# Patient Record
Sex: MALE | Race: BLACK OR AFRICAN AMERICAN | NOT HISPANIC OR LATINO | Employment: FULL TIME | URBAN - NONMETROPOLITAN AREA
[De-identification: names, ages, dates, MRNs, and addresses within clinical notes are randomized per-mention and may not be internally consistent; named-entity substitution may affect disease eponyms.]

---

## 2017-04-24 ENCOUNTER — OFFICE VISIT (OUTPATIENT)
Dept: URGENT CARE | Facility: PHYSICIAN GROUP | Age: 37
End: 2017-04-24

## 2017-04-24 VITALS
RESPIRATION RATE: 14 BRPM | WEIGHT: 206 LBS | DIASTOLIC BLOOD PRESSURE: 62 MMHG | HEART RATE: 84 BPM | SYSTOLIC BLOOD PRESSURE: 100 MMHG | OXYGEN SATURATION: 98 % | TEMPERATURE: 98.6 F | BODY MASS INDEX: 29.49 KG/M2 | HEIGHT: 70 IN

## 2017-04-24 DIAGNOSIS — R10.2 PERINEAL PAIN IN MALE: ICD-10-CM

## 2017-04-24 PROCEDURE — 99214 OFFICE O/P EST MOD 30 MIN: CPT | Performed by: PHYSICIAN ASSISTANT

## 2017-04-24 RX ORDER — CIPROFLOXACIN 500 MG/1
500 TABLET, FILM COATED ORAL 2 TIMES DAILY
Qty: 28 TAB | Refills: 0 | Status: SHIPPED | OUTPATIENT
Start: 2017-04-24 | End: 2017-05-08

## 2017-04-24 RX ORDER — CEFTRIAXONE SODIUM 250 MG/1
250 INJECTION, POWDER, FOR SOLUTION INTRAMUSCULAR; INTRAVENOUS ONCE
Status: COMPLETED | OUTPATIENT
Start: 2017-04-24 | End: 2017-04-24

## 2017-04-24 RX ADMIN — CEFTRIAXONE SODIUM 250 MG: 250 INJECTION, POWDER, FOR SOLUTION INTRAMUSCULAR; INTRAVENOUS at 17:09

## 2017-04-25 NOTE — PROGRESS NOTES
Chief Complaint   Patient presents with   • Dysuria     Prostate issues       HISTORY OF PRESENT ILLNESS: Patient is a 36 y.o. male who presents today for prostate issues. Patient states this same issue has had in the past. He used to see urology and was on antibiotics for months at a time without resolution of his symptoms. He lost his insurance and has not been back to urology. He was seen in September 2016 for the same thing, given a shot of Rocephin, and the symptoms went away until 2 days ago. He is asking if he can have a shot today. Patient is sexually active with one female partner, in a monogamous relationship, same partner in September. He denies any concern for STDs and denies any penile discharge. He denies any urinary symptoms including urinary hesitancy, dysuria, weak stream, and hematuria. He complains of discomfort in the perineal area but denies testicular pain, swelling, genital rashes, rectal pain, and overt perineal pain. His father was diagnosed with prostate cancer he thinks around age 45. Patient denies any fevers, night sweats, chills, bodyaches, and unexplained weight loss.    There are no active problems to display for this patient.      Allergies:Review of patient's allergies indicates no known allergies.    Current Outpatient Prescriptions Ordered in [facility]   Medication Sig Dispense Refill   • ciprofloxacin (CIPRO) 500 MG Tab Take 1 Tab by mouth 2 times a day for 14 days. 28 Tab 0   • doxycycline (VIBRAMYCIN) 100 MG Tab Take 1 Tab by mouth 2 times a day. 20 Tab 0   • hydrocodone-acetaminophen (NORCO) 7.5-325 MG per tablet Take 1 Tab by mouth every 6 hours as needed (pain). 20 Tab 0     Current Facility-Administered Medications Ordered in Epic   Medication Dose Route Frequency Provider Last Rate Last Dose   • cefTRIAXone (ROCEPHIN) injection 250 mg  250 mg Intramuscular Once Jennyfer Laureano PA-C           History reviewed. No pertinent past medical history.    Social History   Substance  "Use Topics   • Smoking status: Never Smoker    • Smokeless tobacco: None   • Alcohol Use: No       No family status information on file.   History reviewed. No pertinent family history.    ROS:   Review of Systems   Constitutional: Negative for fever, chills, weight loss and malaise/fatigue.   HENT: Negative for ear pain, nosebleeds, congestion, sore throat and neck pain.    Eyes: Negative for blurred vision.   Respiratory: Negative for cough, sputum production, shortness of breath and wheezing.    Cardiovascular: Negative for chest pain, palpitations, orthopnea and leg swelling.   Gastrointestinal: Negative for heartburn, nausea, vomiting and abdominal pain.   Genitourinary: Negative for dysuria, urgency and frequency.       Exam:  Blood pressure 100/62, pulse 84, temperature 37 °C (98.6 °F), resp. rate 14, height 1.778 m (5' 10\"), weight 93.441 kg (206 lb), SpO2 98 %.  General: Normal appearing. No distress.  HEENT: Head is grossly normal.  Pulmonary: Clear to ausculation and percussion.  Normal effort. No rales, ronchi, or wheezing.   Cardiovascular: Regular rate and rhythm without murmur.    /rectal: Deferred by patient.  Skin: No obvious lesions.  Psych: Normal mood. Alert and oriented x3. Judgment and insight is normal.     Rocephin 250 mg IM    Assessment/Plan:  Patient to take Cipro if his symptoms persist after the Rocephin injection. Follow-up with urology for persistent or worsening symptoms. Patient to have his PSA check. Will contact patient with lab results.  1. Perineal pain in male  ciprofloxacin (CIPRO) 500 MG Tab    cefTRIAXone (ROCEPHIN) injection 250 mg    PROSTATE SPECIFIC AG         "

## 2017-10-19 ENCOUNTER — OFFICE VISIT (OUTPATIENT)
Dept: URGENT CARE | Facility: PHYSICIAN GROUP | Age: 37
End: 2017-10-19
Payer: COMMERCIAL

## 2017-10-19 VITALS
WEIGHT: 200.2 LBS | RESPIRATION RATE: 16 BRPM | HEIGHT: 70 IN | SYSTOLIC BLOOD PRESSURE: 110 MMHG | BODY MASS INDEX: 28.66 KG/M2 | TEMPERATURE: 98.3 F | HEART RATE: 70 BPM | DIASTOLIC BLOOD PRESSURE: 72 MMHG | OXYGEN SATURATION: 94 %

## 2017-10-19 DIAGNOSIS — J22 ACUTE RESPIRATORY INFECTION: ICD-10-CM

## 2017-10-19 PROCEDURE — 99214 OFFICE O/P EST MOD 30 MIN: CPT | Performed by: FAMILY MEDICINE

## 2017-10-19 RX ORDER — AZITHROMYCIN 250 MG/1
TABLET, FILM COATED ORAL
Qty: 6 TAB | Refills: 0 | Status: SHIPPED | OUTPATIENT
Start: 2017-10-19 | End: 2018-02-06

## 2017-10-19 RX ORDER — PREDNISONE 20 MG/1
TABLET ORAL
Qty: 5 TAB | Refills: 0 | Status: SHIPPED | OUTPATIENT
Start: 2017-10-19 | End: 2018-02-06

## 2017-10-19 NOTE — PROGRESS NOTES
Chief Complaint:    Chief Complaint   Patient presents with   • Cough     fatigue, congestion,no mucus- pt states he had pneumonia 6 years ago       History of Present Illness:    This is a new problem. Symptoms x 5 weeks; has clear rhinorrhea, itchy throat, and non-productive cough. No fever. Using OTC Mucinex DM. Not getting better. Daughter also being seen with similar symptoms x 1 week. Exposed to family member with bronchial pneumonia.      Review of Systems:    Constitutional: Negative for fever, chills, and diaphoresis.   Eyes: Negative for change in vision, photophobia, pain, redness, and discharge.  ENT: See HPI.    Respiratory: See HPI.   Cardiovascular: Negative for chest pain, palpitations, orthopnea, claudication, leg swelling, and PND.   Gastrointestinal: Negative for abdominal pain, nausea, vomiting, diarrhea, constipation, blood in stool, and melena.   Genitourinary: Negative for dysuria, urinary urgency, urinary frequency, hematuria, and flank pain.   Musculoskeletal: Negative for myalgias, joint pain, neck pain, and back pain.   Skin: Negative for rash and itching.   Neurological: Negative for dizziness, tingling, tremors, sensory change, speech change, focal weakness, seizures, loss of consciousness, and headaches.   Endo: Negative for polydipsia.   Heme: Does not bruise/bleed easily.   Psychiatric/Behavioral: Negative for depression, suicidal ideas, hallucinations, memory loss and substance abuse. The patient is not nervous/anxious and does not have insomnia.      Past Medical History:    No past medical history on file.    Past Surgical History:    Past Surgical History:   Procedure Laterality Date   • VASECTOMY  2007       Social History:    Social History     Social History   • Marital status: Single     Spouse name: N/A   • Number of children: N/A   • Years of education: N/A     Occupational History   • Not on file.     Social History Main Topics   • Smoking status: Never Smoker   • Smokeless  "tobacco: Never Used   • Alcohol use 0.6 oz/week     1 Shots of liquor per week   • Drug use: No   • Sexual activity: Yes     Partners: Female     Birth control/ protection: Condom     Other Topics Concern   • Not on file     Social History Narrative   • No narrative on file       Family History:    History reviewed. No pertinent family history.    Medications:    No current outpatient prescriptions on file prior to visit.     No current facility-administered medications on file prior to visit.        Allergies:    No Known Allergies      Vitals:    Vitals:    10/19/17 1021   BP: 110/72   Pulse: 70   Resp: 16   Temp: 36.8 °C (98.3 °F)   SpO2: 94%   Weight: 90.8 kg (200 lb 3.2 oz)   Height: 1.778 m (5' 10\")       Physical Exam:    Constitutional: Vital signs reviewed. Appears well-developed and well-nourished. No acute distress.   Eyes: Sclera white, conjunctivae clear.   ENT: External ears normal. External auditory canals normal without discharge. TMs translucent and non-bulging. Hearing normal. Nasal mucosa erythematous bilaterally. Lips/teeth are normal. Oral mucosa pink and moist. Posterior pharynx: WNL.  Neck: Neck supple.   Cardiovascular: Regular rate and rhythm. No murmur.  Pulmonary/Chest: Respirations non-labored. Clear to auscultation bilaterally.  Lymph: Cervical nodes without tenderness or enlargement.  Musculoskeletal: Normal gait. Normal range of motion. No muscular atrophy or weakness.  Neurological: Alert and oriented to person, place, and time. Muscle tone normal. Coordination normal.   Skin: No rashes or lesions. Warm, dry, normal turgor.  Psychiatric: Normal mood and affect. Behavior is normal. Judgment and thought content normal.       Assessment / Plan:    1. Acute respiratory infection  - azithromycin (ZITHROMAX) 250 MG Tab; 2 TABS ON DAY 1, 1 TAB ON DAYS 2-5.  Dispense: 6 Tab; Refill: 0  - predniSONE (DELTASONE) 20 MG Tab; 1 TAB ONCE A DAY X 5 DAYS. TAKE WITH FOOD.  Dispense: 5 Tab; Refill: " 0      Work note given - excuse for 10/19 and 10/20/17. May return on 10/20/17 if better.    Discussed with him DDX (infection vs. allergies vs. both) and management options.    May continue with OTC meds for symptoms prn.    He is agreeable to treat for infection and allergies.    Agreeable to medications prescribed.    Follow-up with PCP or urgent care if getting worse or not better with above.

## 2017-10-19 NOTE — LETTER
October 19, 2017         Patient: Freddie Cuellar   YOB: 1980   Date of Visit: 10/19/2017           To Whom it May Concern:    Freddie Cuellar was seen in my clinic on 10/19/2017.     Please excuse from work for 10/19 and 10/20/17 due to medical condition.    May return on 10/20/17 if better.    If you have any questions or concerns, please don't hesitate to call.        Sincerely,           Dg Hensley M.D.  Electronically Signed

## 2018-02-06 ENCOUNTER — OFFICE VISIT (OUTPATIENT)
Dept: URGENT CARE | Facility: CLINIC | Age: 38
End: 2018-02-06
Payer: COMMERCIAL

## 2018-02-06 ENCOUNTER — APPOINTMENT (OUTPATIENT)
Dept: RADIOLOGY | Facility: IMAGING CENTER | Age: 38
End: 2018-02-06
Attending: PHYSICIAN ASSISTANT
Payer: COMMERCIAL

## 2018-02-06 VITALS
HEIGHT: 70 IN | SYSTOLIC BLOOD PRESSURE: 106 MMHG | OXYGEN SATURATION: 99 % | WEIGHT: 198.8 LBS | RESPIRATION RATE: 12 BRPM | DIASTOLIC BLOOD PRESSURE: 74 MMHG | BODY MASS INDEX: 28.46 KG/M2 | TEMPERATURE: 98.5 F | HEART RATE: 60 BPM

## 2018-02-06 DIAGNOSIS — S49.92XA INJURY OF LEFT SHOULDER, INITIAL ENCOUNTER: ICD-10-CM

## 2018-02-06 DIAGNOSIS — Z23 NEED FOR INFLUENZA VACCINATION: ICD-10-CM

## 2018-02-06 PROCEDURE — 73030 X-RAY EXAM OF SHOULDER: CPT | Mod: TC,LT | Performed by: PHYSICIAN ASSISTANT

## 2018-02-06 PROCEDURE — 90471 IMMUNIZATION ADMIN: CPT | Performed by: PHYSICIAN ASSISTANT

## 2018-02-06 PROCEDURE — 90686 IIV4 VACC NO PRSV 0.5 ML IM: CPT | Performed by: PHYSICIAN ASSISTANT

## 2018-02-06 PROCEDURE — 99214 OFFICE O/P EST MOD 30 MIN: CPT | Mod: 25 | Performed by: PHYSICIAN ASSISTANT

## 2018-02-06 RX ORDER — METHYLPREDNISOLONE SODIUM SUCCINATE 125 MG/2ML
125 INJECTION, POWDER, LYOPHILIZED, FOR SOLUTION INTRAMUSCULAR; INTRAVENOUS ONCE
Status: COMPLETED | OUTPATIENT
Start: 2018-02-06 | End: 2018-02-06

## 2018-02-06 RX ADMIN — METHYLPREDNISOLONE SODIUM SUCCINATE 125 MG: 125 INJECTION, POWDER, LYOPHILIZED, FOR SOLUTION INTRAMUSCULAR; INTRAVENOUS at 10:12

## 2018-02-06 ASSESSMENT — ENCOUNTER SYMPTOMS
TINGLING: 0
SEIZURES: 0
COUGH: 0
CHILLS: 0
PALPITATIONS: 0
SHORTNESS OF BREATH: 0
LOSS OF CONSCIOUSNESS: 0
SPEECH CHANGE: 0
DIZZINESS: 0
SENSORY CHANGE: 0
BLURRED VISION: 0
HEADACHES: 0
DOUBLE VISION: 0
FOCAL WEAKNESS: 0
FEVER: 0
TREMORS: 0

## 2018-02-06 NOTE — PATIENT INSTRUCTIONS
Rotator Cuff Injury  Rotator cuff injury is any type of injury to the set of muscles and tendons that make up the stabilizing unit of your shoulder. This unit holds the ball of your upper arm bone (humerus) in the socket of your shoulder blade (scapula).   CAUSES  Injuries to your rotator cuff most commonly come from sports or activities that cause your arm to be moved repeatedly over your head. Examples of this include throwing, weight lifting, swimming, or racquet sports. Long lasting (chronic) irritation of your rotator cuff can cause soreness and swelling (inflammation), bursitis, and eventual damage to your tendons, such as a tear (rupture).  SIGNS AND SYMPTOMS  Acute rotator cuff tear:  · Sudden tearing sensation followed by severe pain shooting from your upper shoulder down your arm toward your elbow.  · Decreased range of motion of your shoulder because of pain and muscle spasm.  · Severe pain.  · Inability to raise your arm out to the side because of pain and loss of muscle power (large tears).  Chronic rotator cuff tear:  · Pain that usually is worse at night and may interfere with sleep.  · Gradual weakness and decreased shoulder motion as the pain worsens.  · Decreased range of motion.  Rotator cuff tendinitis:   · Deep ache in your shoulder and the outside upper arm over your shoulder.  · Pain that comes on gradually and becomes worse when lifting your arm to the side or turning it inward.  DIAGNOSIS  Rotator cuff injury is diagnosed through a medical history, physical exam, and imaging exam. The medical history helps determine the type of rotator cuff injury. Your health care provider will look at your injured shoulder, feel the injured area, and ask you to move your shoulder in different positions. X-ray exams typically are done to rule out other causes of shoulder pain, such as fractures. MRI is the exam of choice for the most severe shoulder injuries because the images show muscles and tendons.    TREATMENT   Chronic tear:  · Medicine for pain, such as acetaminophen or ibuprofen.  · Physical therapy and range-of-motion exercises may be helpful in maintaining shoulder function and strength.  · Steroid injections into your shoulder joint.  · Surgical repair of the rotator cuff if the injury does not heal with noninvasive treatment.  Acute tear:  · Anti-inflammatory medicines such as ibuprofen and naproxen to help reduce pain and swelling.  · A sling to help support your arm and rest your rotator cuff muscles. Long-term use of a sling is not advised. It may cause significant stiffening of the shoulder joint.  · Surgery may be considered within a few weeks, especially in younger, active people, to return the shoulder to full function.  · Indications for surgical treatment include the following:  ¨ Age younger than 60 years.  ¨ Rotator cuff tears that are complete.  ¨ Physical therapy, rest, and anti-inflammatory medicines have been used for 6-8 weeks, with no improvement.  ¨ Employment or sporting activity that requires constant shoulder use.  Tendinitis:  · Anti-inflammatory medicines such as ibuprofen and naproxen to help reduce pain and swelling.  · A sling to help support your arm and rest your rotator cuff muscles. Long-term use of a sling is not advised. It may cause significant stiffening of the shoulder joint.  · Severe tendinitis may require:  ¨ Steroid injections into your shoulder joint.  ¨ Physical therapy.  ¨ Surgery.  HOME CARE INSTRUCTIONS   · Apply ice to your injury:  ¨ Put ice in a plastic bag.  ¨ Place a towel between your skin and the bag.  ¨ Leave the ice on for 20 minutes, 2-3 times a day.  · If you have a shoulder immobilizer (sling and straps), wear it until told otherwise by your health care provider.  · You may want to sleep on several pillows or in a recliner at night to lessen swelling and pain.  · Only take over-the-counter or prescription medicines for pain, discomfort, or fever as  directed by your health care provider.  · Do simple hand squeezing exercises with a soft rubber ball to decrease hand swelling.  SEEK MEDICAL CARE IF:   · Your shoulder pain increases, or new pain or numbness develops in your arm, hand, or fingers.  · Your hand or fingers are colder than your other hand.  SEEK IMMEDIATE MEDICAL CARE IF:   · Your arm, hand, or fingers are numb or tingling.  · Your arm, hand, or fingers are increasingly swollen and painful, or they turn white or blue.  MAKE SURE YOU:  · Understand these instructions.  · Will watch your condition.  · Will get help right away if you are not doing well or get worse.     This information is not intended to replace advice given to you by your health care provider. Make sure you discuss any questions you have with your health care provider.     Document Released: 12/15/2001 Document Revised: 12/23/2014 Document Reviewed: 07/30/2014  [employer] Interactive Patient Education ©2016 [employer]

## 2018-02-06 NOTE — PROGRESS NOTES
"Subjective:      Freddie Cuellar is a 37 y.o. male who presents with Shoulder Injury (Dislocated last night )            Shoulder Injury    The incident occurred at the gym. The left shoulder is affected. The incident occurred 2 days ago. Injury mechanism: lifting weights. The pain is moderate. Pertinent negatives include no chest pain or tingling. The symptoms are aggravated by movement, overhead lifting and palpation. He has tried nothing for the symptoms.       Review of Systems   Constitutional: Negative for chills and fever.   Eyes: Negative for blurred vision and double vision.   Respiratory: Negative for cough and shortness of breath.    Cardiovascular: Negative for chest pain and palpitations.   Musculoskeletal:        Left shoulder pain.   Skin: Negative for rash.   Neurological: Negative for dizziness, tingling, tremors, sensory change, speech change, focal weakness, seizures, loss of consciousness and headaches.   All other systems reviewed and are negative.    PMH:  has no past medical history of Diabetes.  MEDS: No current outpatient prescriptions on file.    Current Facility-Administered Medications:   •  methylPREDNISolone sod succ (SOLU-MEDROL) 125 MG injection 125 mg, 125 mg, Intramuscular, Once, Rob Monae, P.A.-C.  ALLERGIES: No Known Allergies  SURGHX:   Past Surgical History:   Procedure Laterality Date   • VASECTOMY  2007     SOCHX:  reports that he has never smoked. He has never used smokeless tobacco. He reports that he drinks about 0.6 oz of alcohol per week . He reports that he does not use drugs.  FH: Family history was reviewed, no pertinent findings to report  Medications, Allergies, and current problem list reviewed today in Epic       Objective:     /74   Pulse 60   Temp 36.9 °C (98.5 °F)   Resp 12   Ht 1.778 m (5' 10\")   Wt 90.2 kg (198 lb 12.8 oz)   SpO2 99%   BMI 28.52 kg/m²      Physical Exam   Constitutional: He is oriented to person, place, and time. He " appears well-developed and well-nourished.   Cardiovascular: Normal rate, regular rhythm, normal heart sounds, intact distal pulses and normal pulses.    Pulmonary/Chest: Effort normal and breath sounds normal.   Musculoskeletal: He exhibits tenderness. He exhibits no edema or deformity.   Cannot lift L arm vertically.  Pain palpated with deltoid.   Neurological: He is alert and oriented to person, place, and time. He has normal reflexes. He displays normal reflexes. He exhibits normal muscle tone. Coordination normal.   Skin: Skin is warm and dry.   Psychiatric: He has a normal mood and affect. His behavior is normal. Judgment and thought content normal.   Vitals reviewed.           2/6/2018 9:30 AM    HISTORY/REASON FOR EXAM:  Pain/Deformity Following Trauma.  LEFT shoulder pain and limited range of motion after lifting weights last night    TECHNIQUE/EXAM DESCRIPTION AND NUMBER OF VIEWS:  3 views of the LEFT shoulder.    COMPARISON: 4/12/2016    FINDINGS:  LEFT clavicle is intact.  Mild degenerative change of AC joint.  LEFT proximal humerus is intact and normally located.  Visualized LEFT upper chest is unremarkable.   Impression       No fracture or dislocation of LEFT shoulder.       Assessment/Plan:   Pt was bench pressing when he experienced pain in the shoulder.  He heard a crunching sound.  This has happened before.  He cannot lift his arm above his head.  X Ray normal.  Suspect Rotator cuff or labrum injury.  Pt declines sling.  1. Need for influenza vaccination    - INFLUENZA VACCINE QUAD INJ >3Y(PF)    2. Injury of left shoulder, initial encounter    - DX-SHOULDER 2+ LEFT; Future  - methylPREDNISolone sod succ (SOLU-MEDROL) 125 MG injection 125 mg; 2 mL by Intramuscular route Once.  - REFERRAL TO SPORTS MEDICINE    Differential diagnosis, natural history, supportive care discussed. Follow-up with primary care provider within 7-10 days, emergency room precautions discussed.  Patient and/or family appears  understanding of information.

## 2018-02-08 ENCOUNTER — OFFICE VISIT (OUTPATIENT)
Dept: MEDICAL GROUP | Facility: CLINIC | Age: 38
End: 2018-02-08
Payer: COMMERCIAL

## 2018-02-08 VITALS
TEMPERATURE: 98.1 F | RESPIRATION RATE: 18 BRPM | OXYGEN SATURATION: 96 % | SYSTOLIC BLOOD PRESSURE: 122 MMHG | DIASTOLIC BLOOD PRESSURE: 80 MMHG | BODY MASS INDEX: 28.46 KG/M2 | HEART RATE: 92 BPM | HEIGHT: 70 IN | WEIGHT: 198.8 LBS

## 2018-02-08 DIAGNOSIS — M25.312 SHOULDER INSTABILITY, LEFT: ICD-10-CM

## 2018-02-08 DIAGNOSIS — M89.9 SCAPULAR DYSFUNCTION: ICD-10-CM

## 2018-02-08 DIAGNOSIS — R30.0 DYSURIA: ICD-10-CM

## 2018-02-08 DIAGNOSIS — N40.0 PROSTATISM: ICD-10-CM

## 2018-02-08 LAB
APPEARANCE UR: CLEAR
COLOR UR AUTO: YELLOW
GLUCOSE UR STRIP.AUTO-MCNC: NEGATIVE MG/DL
KETONES UR STRIP.AUTO-MCNC: NEGATIVE MG/DL
LEUKOCYTE ESTERASE UR QL STRIP.AUTO: NEGATIVE
NITRITE UR QL STRIP.AUTO: NEGATIVE
PH UR STRIP.AUTO: 6.5 [PH] (ref 5–8)
PROT UR QL STRIP: NORMAL MG/DL
RBC UR QL AUTO: NORMAL
SP GR UR STRIP.AUTO: 1.01

## 2018-02-08 PROCEDURE — 99203 OFFICE O/P NEW LOW 30 MIN: CPT | Performed by: FAMILY MEDICINE

## 2018-02-08 PROCEDURE — 81002 URINALYSIS NONAUTO W/O SCOPE: CPT | Performed by: FAMILY MEDICINE

## 2018-02-09 NOTE — PROGRESS NOTES
"CHIEF COMPLAINT:  Freddie Cuellar male presenting at the request of Rob Monae PA-C  for evaluation of Shoulderpain.     Freddie Cuellar is complaining of left shoulder pain (right handed dominant)  Date of injury February 5, 2018  While lifting weights at the gym  Performing flat bench press lifting 225 pounds, around mid left he felt a crackling sound at the left anterior chest/shoulder  Quality is aching  Pain is Non-radiating  Aggravated by shoulder falling back/chest stretching  Improved with  rest   Prior issue with the LEFT shoulder (subluxation?)  Prior Treatments: prior LEFT subacromial corticosteroid injection after subluxation?  Prior studies: X-Ray   Medications tried for pain include: none, intramuscular Solu-Medrol provided at his urgent care visit  Mechanical Symptom history: No Locking    REVIEW OF SYSTEMS  No Nausea, No Vomiting, No Chest Pain, No Shortness of Breath, No Dizziness, No Headache    PAST MEDICAL HISTORY:   History reviewed. No pertinent past medical history.    PMH:  has no past medical history of Diabetes.  MEDS: No current outpatient prescriptions on file.  ALLERGIES: No Known Allergies  SURGHX:   Past Surgical History:   Procedure Laterality Date   • VASECTOMY  2007     SOCHX:  reports that he has never smoked. He has never used smokeless tobacco. He reports that he drinks about 0.6 oz of alcohol per week . He reports that he does not use drugs.  FH: Family history was reviewed, no pertinent findings to report     PHYSICAL EXAM:  /80   Pulse 92   Temp 36.7 °C (98.1 °F)   Resp 18   Ht 1.778 m (5' 10\")   Wt 90.2 kg (198 lb 12.8 oz)   SpO2 96%   BMI 28.52 kg/m²       muscular in no apparent distress, alert and oriented x 3.  Gait: normal    Cervical spine:  Range of motion Intact  Spurling's testing is NEGATIVE  Cervical spine tenderness NEGATIVE    Strength testing:     Deltoid, bilateral 5/5  Bicep, bilateral 5/5  Tricep, bilateral 5/5  Wrist Extension, bilateral " "5/5  Wrist Flexion, bilateral 5/5  Finger Abduction, bilateral 5/5    Sensation:  INTACT Bilaterally    Reflexes:   Biceps: R 2+/L 2+  Triceps: R 2+/L  2+  Brachial radialis R 2+/L  2+  Martinez's testing is NEGATIVE  The arms are otherwise neurovascularly intact     Shoulder Exam:    RIGHT Shoulder:  No visible swelling   Range of motion INTACT  Tenderness: Non-tender  Empty Can Testing 5/5  Internal Rotation 5/5  External Rotation 5/5  Lift Off Testing 5/5  Impingement testing Patel  NEGATIVE  Neer's testing NEGATIVE  Apprehension testing NEGATIVE  Relocation testing NEGATIVE  Wade's Testing NEGATIVE  Grind Testing NEGATIVE      LEFT Shoulder:  No visible swelling   Range of motion INTACT  Tenderness: Non-tender  Empty Can Testing 5/5  Internal Rotation 5/5  External Rotation 5/5  Lift Off Testing 5/5  Impingement testing Patel  NEGATIVE  Neer's testing NEGATIVE  Apprehension testing POSITIVE  Relocation testing NEGATIVE  Wade's Testing NEGATIVE  Grind Testing POSITIVE      Additional Findings: Scapular winging on the LEFT      1. Shoulder instability, left     2. Scapular dysfunction  REFERRAL TO PHYSICAL THERAPY Reason for Therapy: Eval/Treat/Report   3. Dysuria  POC URINALYSIS   4. Prostatism  REFERRAL TO UROLOGY     Acute injury to the LEFT shoulder while weight lifting, but presents  Originally, presentation seemed to be like a LEFT pectoralis.  However, pectoralis was nontender, and resisted strength testing of the pectoralis did NOT reproduce his symptoms  POSITIVE findings suggestive labral findings  Patient does report a \"partial dislocation\" suggestive subluxation which occurred back in 2016 for which he underwent a subacromial corticosteroid injection with Yasmany Worthy PA-C    With his scapular findings, this is suspicious for labral tear  Referral for formal physical therapy    Regarding his dysuria, he has had prior issues with recurrent prostatitis  Has had some prostate symptoms in the past " few weeks  Urinalysis in the office today demonstrates microscopic hematuria, otherwise no nitrates or leukocytes  Referral for reevaluation by urology, as he has seen urology in the past    Return in about 4 weeks (around 3/8/2018). To see how he is doing the formal physical therapy, If symptoms worsen or fail to improve despite physical therapy, we will consider MRI with arthrogram at that time                                                                             Results for orders placed in visit on 02/06/18   DX-SHOULDER 2+ LEFT    Impression No fracture or dislocation of LEFT shoulder.                  done elsewhere and reviewed independently by me    Thank you Rob Monae PA-C for allowing me to participate in caring for your patient

## 2020-09-29 ENCOUNTER — OFFICE VISIT (OUTPATIENT)
Dept: URGENT CARE | Facility: CLINIC | Age: 40
End: 2020-09-29

## 2020-09-29 VITALS
HEART RATE: 82 BPM | OXYGEN SATURATION: 98 % | TEMPERATURE: 98.7 F | HEIGHT: 71 IN | RESPIRATION RATE: 16 BRPM | DIASTOLIC BLOOD PRESSURE: 80 MMHG | BODY MASS INDEX: 27.28 KG/M2 | SYSTOLIC BLOOD PRESSURE: 118 MMHG | WEIGHT: 194.89 LBS

## 2020-09-29 DIAGNOSIS — R31.9 HEMATURIA, UNSPECIFIED TYPE: ICD-10-CM

## 2020-09-29 LAB
APPEARANCE UR: CLEAR
BILIRUB UR STRIP-MCNC: NORMAL MG/DL
COLOR UR AUTO: YELLOW
GLUCOSE UR STRIP.AUTO-MCNC: NORMAL MG/DL
KETONES UR STRIP.AUTO-MCNC: NORMAL MG/DL
LEUKOCYTE ESTERASE UR QL STRIP.AUTO: NORMAL
NITRITE UR QL STRIP.AUTO: NORMAL
PH UR STRIP.AUTO: 5.5 [PH] (ref 5–8)
PROT UR QL STRIP: NORMAL MG/DL
RBC UR QL AUTO: NORMAL
SP GR UR STRIP.AUTO: 1.02
UROBILINOGEN UR STRIP-MCNC: 0.2 MG/DL

## 2020-09-29 PROCEDURE — 81002 URINALYSIS NONAUTO W/O SCOPE: CPT | Performed by: FAMILY MEDICINE

## 2020-09-29 PROCEDURE — 99214 OFFICE O/P EST MOD 30 MIN: CPT | Performed by: FAMILY MEDICINE

## 2020-09-29 ASSESSMENT — ENCOUNTER SYMPTOMS
EYE DISCHARGE: 0
NAUSEA: 0
MYALGIAS: 0
WEIGHT LOSS: 0
VOMITING: 0
EYE REDNESS: 0

## 2020-09-29 NOTE — PROGRESS NOTES
"Subjective:      Freddie Cuelalr is a 40 y.o. male who presents with UTI (poosible kidney stone)            5d ago silvina hematuria. Intermittent right flank pain. Sensation of \"irritation\" after urinating. No pain during during urination. No urinary urgency or frequency. No penile discharge. No concern for STI. No fever. No other aggravating or alleviating factors.        Review of Systems   Constitutional: Negative for malaise/fatigue and weight loss.   Eyes: Negative for discharge and redness.   Gastrointestinal: Negative for nausea and vomiting.   Musculoskeletal: Negative for joint pain and myalgias.   Skin: Negative for itching and rash.     .  Medications, Allergies, and current problem list reviewed today in Epic  PM prostatitis. No PMH UTI. Possible PMH kidney stone/no imaging at that time.      Objective:     /80   Pulse 82   Temp 37.1 °C (98.7 °F) (Temporal)   Resp 16   Ht 1.791 m (5' 10.5\")   Wt 88.4 kg (194 lb 14.2 oz)   SpO2 98%   BMI 27.57 kg/m²      Physical Exam  Constitutional:       General: He is not in acute distress.     Appearance: He is well-developed.   HENT:      Head: Normocephalic and atraumatic.   Eyes:      Conjunctiva/sclera: Conjunctivae normal.   Cardiovascular:      Rate and Rhythm: Normal rate and regular rhythm.      Heart sounds: Normal heart sounds. No murmur.   Pulmonary:      Effort: Pulmonary effort is normal.      Breath sounds: Normal breath sounds. No wheezing.   Genitourinary:     Comments: Mild right CVAT  Skin:     General: Skin is warm and dry.      Findings: No rash.   Neurological:      Mental Status: He is alert and oriented to person, place, and time.                 Assessment/Plan:       UA reviewed    1. Hematuria, unspecified type  CREATININE    CT-RENAL COLIC EVALUATION(A/P W/O)    URINALYSIS     Differential diagnosis, natural history, supportive care, and indications for immediate follow-up discussed at length.     I have a high suspicion for " kidney stone.  Patient is self-pay and would like to wait a few days to see if he passes it.  If he continues to have symptoms he will schedule CT on his own.  I do recommend a creatinine level and I have also written an an for follow-up urinalysis.

## 2022-01-13 ENCOUNTER — TELEPHONE (OUTPATIENT)
Dept: SCHEDULING | Facility: IMAGING CENTER | Age: 42
End: 2022-01-13

## 2022-05-14 ENCOUNTER — HOSPITAL ENCOUNTER (OUTPATIENT)
Facility: MEDICAL CENTER | Age: 42
End: 2022-05-14
Attending: NURSE PRACTITIONER
Payer: COMMERCIAL

## 2022-05-14 ENCOUNTER — OFFICE VISIT (OUTPATIENT)
Dept: URGENT CARE | Facility: PHYSICIAN GROUP | Age: 42
End: 2022-05-14
Payer: COMMERCIAL

## 2022-05-14 VITALS
RESPIRATION RATE: 16 BRPM | SYSTOLIC BLOOD PRESSURE: 124 MMHG | HEIGHT: 71 IN | DIASTOLIC BLOOD PRESSURE: 84 MMHG | HEART RATE: 91 BPM | TEMPERATURE: 96.7 F | WEIGHT: 209 LBS | BODY MASS INDEX: 29.26 KG/M2 | OXYGEN SATURATION: 98 %

## 2022-05-14 DIAGNOSIS — R53.83 FATIGUE, UNSPECIFIED TYPE: ICD-10-CM

## 2022-05-14 DIAGNOSIS — Z91.09 HISTORY OF ENVIRONMENTAL ALLERGIES: ICD-10-CM

## 2022-05-14 DIAGNOSIS — R39.15 URGENCY OF URINATION: ICD-10-CM

## 2022-05-14 DIAGNOSIS — Z11.3 SCREEN FOR STD (SEXUALLY TRANSMITTED DISEASE): ICD-10-CM

## 2022-05-14 DIAGNOSIS — J30.9 ALLERGIC RHINITIS, UNSPECIFIED SEASONALITY, UNSPECIFIED TRIGGER: ICD-10-CM

## 2022-05-14 DIAGNOSIS — Z80.42 FAMILY HISTORY OF PROSTATE CANCER: ICD-10-CM

## 2022-05-14 LAB
APPEARANCE UR: CLEAR
BILIRUB UR STRIP-MCNC: NEGATIVE MG/DL
COLOR UR AUTO: NORMAL
GLUCOSE UR STRIP.AUTO-MCNC: NEGATIVE MG/DL
KETONES UR STRIP.AUTO-MCNC: NEGATIVE MG/DL
LEUKOCYTE ESTERASE UR QL STRIP.AUTO: NEGATIVE
NITRITE UR QL STRIP.AUTO: NEGATIVE
PH UR STRIP.AUTO: 6.5 [PH] (ref 5–8)
PROT UR QL STRIP: NEGATIVE MG/DL
RBC UR QL AUTO: NORMAL
SP GR UR STRIP.AUTO: 1.02
UROBILINOGEN UR STRIP-MCNC: 1 MG/DL

## 2022-05-14 PROCEDURE — 87591 N.GONORRHOEAE DNA AMP PROB: CPT

## 2022-05-14 PROCEDURE — 81002 URINALYSIS NONAUTO W/O SCOPE: CPT | Performed by: NURSE PRACTITIONER

## 2022-05-14 PROCEDURE — 87086 URINE CULTURE/COLONY COUNT: CPT

## 2022-05-14 PROCEDURE — 99214 OFFICE O/P EST MOD 30 MIN: CPT | Performed by: NURSE PRACTITIONER

## 2022-05-14 PROCEDURE — 87491 CHLMYD TRACH DNA AMP PROBE: CPT

## 2022-05-14 RX ORDER — TRIAMCINOLONE ACETONIDE 40 MG/ML
40 INJECTION, SUSPENSION INTRA-ARTICULAR; INTRAMUSCULAR ONCE
Status: COMPLETED | OUTPATIENT
Start: 2022-05-14 | End: 2022-05-14

## 2022-05-14 RX ORDER — DIAZEPAM 10 MG/1
TABLET ORAL
COMMUNITY
Start: 2022-03-10 | End: 2022-05-14

## 2022-05-14 RX ADMIN — TRIAMCINOLONE ACETONIDE 40 MG: 40 INJECTION, SUSPENSION INTRA-ARTICULAR; INTRAMUSCULAR at 15:14

## 2022-05-14 ASSESSMENT — ENCOUNTER SYMPTOMS
FATIGUE: 1
FEVER: 0

## 2022-05-14 ASSESSMENT — VISUAL ACUITY: OU: 1

## 2022-05-14 NOTE — PROGRESS NOTES
Subjective:     Freddie Cuellar is a 42 y.o. male who presents for Seasonal Allergies (Wants kenelog shot) and Dysuria (Family hx of prostate cancer )       Fatigue  This is a chronic problem. The problem has been unchanged. Associated symptoms include congestion and fatigue. Pertinent negatives include no fever.   Other  This is a new problem. The problem has been gradually worsening. Associated symptoms include congestion and fatigue. Pertinent negatives include no fever.     Patient presents with multiple concerns.    Reports history of environmental allergies.  Has used OTC medications with no improvement.  Reports receiving Kenalog shot in the past which helped.  Interested in the same.  Last received 3 years ago.    Reports sensation of urinary urgency.  Otherwise, denies other symptoms at this time.  No dysuria or discharge.  Denies fever.  Reports monogamous relationship with single female partner.  However, would like STD screening.  Reports past personal history of prostatitis and urethritis.  Reports family history of prostate cancer which concerns him.  Father  from it.    Reports chronic fatigue.  Worried about his testosterone level.  Trying to establish with PCP, however, appointment keeps getting postponed.    Patient was screened prior to rooming and denied COVID-19 diagnosis or contact with a person who has been diagnosed or is suspected to have COVID-19. During this visit, appropriate PPE was worn, hand hygiene was performed, and the patient and any visitors were masked.     PMH:  has no past medical history on file.    MEDS: No current outpatient medications on file.    ALLERGIES: No Known Allergies    SURGHX: History reviewed. No pertinent surgical history.    SOCHX:       FH: Reviewed with patient, not pertinent to this visit.    Review of Systems   Constitutional: Positive for fatigue. Negative for fever and malaise/fatigue.   HENT: Positive for congestion.    Genitourinary: Positive for  "urgency. Negative for dysuria.   Endo/Heme/Allergies: Positive for environmental allergies.   All other systems reviewed and are negative.    Additional details per HPI.      Objective:     /84   Pulse 91   Temp 35.9 °C (96.7 °F) (Temporal)   Resp 16   Ht 1.791 m (5' 10.5\")   Wt 94.8 kg (209 lb)   SpO2 98%   BMI 29.56 kg/m²     Physical Exam  Vitals reviewed.   Constitutional:       General: He is not in acute distress.     Appearance: He is well-developed. He is not ill-appearing or toxic-appearing.   Eyes:      General: Vision grossly intact.      Extraocular Movements: Extraocular movements intact.   Cardiovascular:      Rate and Rhythm: Normal rate.   Pulmonary:      Effort: Pulmonary effort is normal. No respiratory distress.   Musculoskeletal:         General: No deformity. Normal range of motion.      Cervical back: Normal range of motion.   Skin:     Coloration: Skin is not pale.   Neurological:      Mental Status: He is alert and oriented to person, place, and time.      Sensory: No sensory deficit.      Motor: No weakness.   Psychiatric:         Behavior: Behavior normal. Behavior is cooperative.     UA: trace blood      Assessment/Plan:     1. Allergic rhinitis, unspecified seasonality, unspecified trigger  - triamcinolone acetonide (KENALOG-40) injection 40 mg  - Referral to Allergy    2. History of environmental allergies  - triamcinolone acetonide (KENALOG-40) injection 40 mg  - Referral to Allergy    3. Fatigue, unspecified type  - CBC WITH DIFFERENTIAL; Future  - Comp Metabolic Panel; Future  - ESTIMATED GFR; Future  - TSH WITH REFLEX TO FT4; Future  - TESTOSTERONE SERUM; Future    4. Urgency of urination  - POCT Urinalysis  - URINE CULTURE(NEW); Future    5. Screen for STD (sexually transmitted disease)  - Chlamydia/GC, PCR (Urine); Future    6. Family history of prostate cancer  - PROSTATE SPECIFIC AG SCREENING; Future    Kenalog IM given in clinic.  Patient tolerated well.  No " complications.  Advised OTC daily antihistamine as well as Flonase.  Follow-up with allergy.  Referral placed.    Studies pending to further evaluate fatigue and urinary symptoms/concerns; will contact with results. Patient advised to follow up with primary care.    Differential diagnosis, natural history, supportive care, over-the-counter symptom management per 's instructions, close monitoring, and indications for immediate follow-up discussed.     All questions answered. Patient agrees with the plan of care.

## 2022-05-16 DIAGNOSIS — R39.15 URGENCY OF URINATION: ICD-10-CM

## 2022-05-16 DIAGNOSIS — Z11.3 SCREEN FOR STD (SEXUALLY TRANSMITTED DISEASE): ICD-10-CM

## 2022-05-17 LAB
C TRACH DNA SPEC QL NAA+PROBE: NEGATIVE
N GONORRHOEA DNA SPEC QL NAA+PROBE: NEGATIVE
SPECIMEN SOURCE: NORMAL

## 2022-05-18 ENCOUNTER — HOSPITAL ENCOUNTER (OUTPATIENT)
Dept: LAB | Facility: MEDICAL CENTER | Age: 42
End: 2022-05-18
Attending: NURSE PRACTITIONER
Payer: COMMERCIAL

## 2022-05-18 DIAGNOSIS — R53.83 FATIGUE, UNSPECIFIED TYPE: ICD-10-CM

## 2022-05-18 DIAGNOSIS — Z80.42 FAMILY HISTORY OF PROSTATE CANCER: ICD-10-CM

## 2022-05-18 LAB
ALBUMIN SERPL BCP-MCNC: 4.6 G/DL (ref 3.2–4.9)
ALBUMIN/GLOB SERPL: 1.8 G/DL
ALP SERPL-CCNC: 102 U/L (ref 30–99)
ALT SERPL-CCNC: 24 U/L (ref 2–50)
ANION GAP SERPL CALC-SCNC: 12 MMOL/L (ref 7–16)
AST SERPL-CCNC: 30 U/L (ref 12–45)
BASOPHILS # BLD AUTO: 0.7 % (ref 0–1.8)
BASOPHILS # BLD: 0.04 K/UL (ref 0–0.12)
BILIRUB SERPL-MCNC: 0.3 MG/DL (ref 0.1–1.5)
BUN SERPL-MCNC: 19 MG/DL (ref 8–22)
CALCIUM SERPL-MCNC: 9.5 MG/DL (ref 8.5–10.5)
CHLORIDE SERPL-SCNC: 108 MMOL/L (ref 96–112)
CO2 SERPL-SCNC: 20 MMOL/L (ref 20–33)
CREAT SERPL-MCNC: 1.26 MG/DL (ref 0.5–1.4)
EOSINOPHIL # BLD AUTO: 0.03 K/UL (ref 0–0.51)
EOSINOPHIL NFR BLD: 0.5 % (ref 0–6.9)
ERYTHROCYTE [DISTWIDTH] IN BLOOD BY AUTOMATED COUNT: 47 FL (ref 35.9–50)
GFR SERPLBLD CREATININE-BSD FMLA CKD-EPI: 72 ML/MIN/1.73 M 2
GLOBULIN SER CALC-MCNC: 2.6 G/DL (ref 1.9–3.5)
GLUCOSE SERPL-MCNC: 101 MG/DL (ref 65–99)
HCT VFR BLD AUTO: 43.4 % (ref 42–52)
HGB BLD-MCNC: 15.4 G/DL (ref 14–18)
IMM GRANULOCYTES # BLD AUTO: 0.03 K/UL (ref 0–0.11)
IMM GRANULOCYTES NFR BLD AUTO: 0.5 % (ref 0–0.9)
LYMPHOCYTES # BLD AUTO: 2.31 K/UL (ref 1–4.8)
LYMPHOCYTES NFR BLD: 41.5 % (ref 22–41)
MCH RBC QN AUTO: 28.9 PG (ref 27–33)
MCHC RBC AUTO-ENTMCNC: 35.5 G/DL (ref 33.7–35.3)
MCV RBC AUTO: 81.6 FL (ref 81.4–97.8)
MONOCYTES # BLD AUTO: 0.46 K/UL (ref 0–0.85)
MONOCYTES NFR BLD AUTO: 8.3 % (ref 0–13.4)
NEUTROPHILS # BLD AUTO: 2.7 K/UL (ref 1.82–7.42)
NEUTROPHILS NFR BLD: 48.5 % (ref 44–72)
NRBC # BLD AUTO: 0 K/UL
NRBC BLD-RTO: 0 /100 WBC
PLATELET # BLD AUTO: 143 K/UL (ref 164–446)
PMV BLD AUTO: 12.5 FL (ref 9–12.9)
POTASSIUM SERPL-SCNC: 4.2 MMOL/L (ref 3.6–5.5)
PROT SERPL-MCNC: 7.2 G/DL (ref 6–8.2)
PSA SERPL-MCNC: 0.26 NG/ML (ref 0–4)
RBC # BLD AUTO: 5.32 M/UL (ref 4.7–6.1)
SODIUM SERPL-SCNC: 140 MMOL/L (ref 135–145)
TESTOST SERPL-MCNC: 279 NG/DL (ref 175–781)
TSH SERPL DL<=0.005 MIU/L-ACNC: 1.15 UIU/ML (ref 0.38–5.33)
WBC # BLD AUTO: 5.6 K/UL (ref 4.8–10.8)

## 2022-05-18 PROCEDURE — 85025 COMPLETE CBC W/AUTO DIFF WBC: CPT

## 2022-05-18 PROCEDURE — 36415 COLL VENOUS BLD VENIPUNCTURE: CPT

## 2022-05-18 PROCEDURE — 84443 ASSAY THYROID STIM HORMONE: CPT

## 2022-05-18 PROCEDURE — 84403 ASSAY OF TOTAL TESTOSTERONE: CPT

## 2022-05-18 PROCEDURE — 80053 COMPREHEN METABOLIC PANEL: CPT

## 2022-05-18 PROCEDURE — 84153 ASSAY OF PSA TOTAL: CPT

## 2022-05-19 ENCOUNTER — OFFICE VISIT (OUTPATIENT)
Dept: MEDICAL GROUP | Facility: PHYSICIAN GROUP | Age: 42
End: 2022-05-19
Payer: COMMERCIAL

## 2022-05-19 VITALS
HEIGHT: 71 IN | SYSTOLIC BLOOD PRESSURE: 110 MMHG | OXYGEN SATURATION: 97 % | BODY MASS INDEX: 29.37 KG/M2 | TEMPERATURE: 98.2 F | WEIGHT: 209.8 LBS | DIASTOLIC BLOOD PRESSURE: 76 MMHG | HEART RATE: 60 BPM | RESPIRATION RATE: 16 BRPM

## 2022-05-19 DIAGNOSIS — F51.01 PRIMARY INSOMNIA: ICD-10-CM

## 2022-05-19 DIAGNOSIS — R53.82 CHRONIC FATIGUE: ICD-10-CM

## 2022-05-19 DIAGNOSIS — Z80.42 FAMILY HISTORY OF PROSTATE CANCER: ICD-10-CM

## 2022-05-19 DIAGNOSIS — R73.01 IMPAIRED FASTING BLOOD SUGAR: ICD-10-CM

## 2022-05-19 DIAGNOSIS — Z76.89 ENCOUNTER TO ESTABLISH CARE: ICD-10-CM

## 2022-05-19 DIAGNOSIS — Z13.6 SCREENING FOR CARDIOVASCULAR CONDITION: ICD-10-CM

## 2022-05-19 PROBLEM — G47.00 INSOMNIA: Status: ACTIVE | Noted: 2022-05-19

## 2022-05-19 LAB
BACTERIA UR CULT: NORMAL
SIGNIFICANT IND 70042: NORMAL
SITE SITE: NORMAL
SOURCE SOURCE: NORMAL

## 2022-05-19 PROCEDURE — 99214 OFFICE O/P EST MOD 30 MIN: CPT | Performed by: NURSE PRACTITIONER

## 2022-05-19 RX ORDER — FINASTERIDE 5 MG/1
5 TABLET, FILM COATED ORAL DAILY
COMMUNITY

## 2022-05-19 ASSESSMENT — FIBROSIS 4 INDEX: FIB4 SCORE: 1.84

## 2022-05-19 ASSESSMENT — PATIENT HEALTH QUESTIONNAIRE - PHQ9: CLINICAL INTERPRETATION OF PHQ2 SCORE: 0

## 2022-05-19 NOTE — ASSESSMENT & PLAN NOTE
This is a new condition.  Patient reports that over the past several months he has had an increase in insomnia symptoms and chronic daytime fatigue.  He reports that sometimes he will go to bed at 9:00 and not fall asleep until 5 AM.  He reports sometimes he does have racing thoughts.  He denies having a TV or using his phone in the bedroom.  He has not tried over-the-counter sleep aids.

## 2022-05-19 NOTE — PROGRESS NOTES
Chief Complaint   Patient presents with   • New Patient     Est care    Tired a lot, wants testosterone checked, hx of prostate problems       Subjective:     HPI:   Freddie Cuellar is a 41 y.o. male here to discuss the evaluation and management of:      Chronic fatigue  Is a chronic condition.  Patient was most recently seen in urgent care and discussed chronic fatigue.  Reviewed labs that were ordered indicating normal thyroid function, no anemia, normal testosterone.    He does report insomnia symptoms.  Previous history of HANNA that was resolved with surgery.  Patient does report occasional snoring.        Insomnia  This is a new condition.  Patient reports that over the past several months he has had an increase in insomnia symptoms and chronic daytime fatigue.  He reports that sometimes he will go to bed at 9:00 and not fall asleep until 5 AM.  He reports sometimes he does have racing thoughts.  He denies having a TV or using his phone in the bedroom.  He has not tried over-the-counter sleep aids.    Encounter to establish care  Very pleasant 41-year-old male presents today to establish care and discuss chronic fatigue and insomnia.  I reviewed and updated his medical history, surgical history, family history, medications, allergies, and social determinants of health.    Reviewed most recent urgent care visit notes and labs with patient and answered all questions.    Patient does report family history of diabetes on his mom side.  He does have strong family history of prostate cancer with his dad dying at the age of 65 for prostate cancer and his grandfather dying from complications due to prostate cancer.        Impaired fasting blood sugar  Reviewed most recent labs indicating mildly elevated fasting blood sugar.  Patient denies any symptoms of hyperglycemia.    Family history of prostate cancer  Patient reports that his father  of prostate cancer at age of 45 and his paternal grandfather also had  "prostate cancer.    Reviewed labs with patient indicating normal PSA.  We will continue to monitor annually.        ROS:  Gen: no fevers/chills, no changes in weight  Pulm: no sob, no cough  CV: no chest pain, no palpitations  GI: no nausea/vomiting, no diarrhea  MSk: no myalgias  Neuro: no headaches, no numbness/tingling      Allergies   Allergen Reactions   • Seasonal      Runny nose, watery eyes, scratchy throat       Current medicines (including changes today)  Current Outpatient Medications   Medication Sig Dispense Refill   • finasteride (PROSCAR) 5 MG Tab Take 5 mg by mouth every day.       No current facility-administered medications for this visit.          Objective:       /76   Pulse 60   Temp 36.8 °C (98.2 °F) (Temporal)   Resp 16   Ht 1.803 m (5' 11\")   Wt 95.2 kg (209 lb 12.8 oz)   SpO2 97%  Body mass index is 29.26 kg/m².    Physical Exam:  Constitutional: Well-developed and well-nourished male in NAD. Not diaphoretic. No distress.   Skin: warm, dry, intact  Cardiovascular: Regular rate and rhythm without murmur. Radial pulses are intact and equal bilaterally.  Pulmonary: Clear to ausculation. Normal effort. No rales, ronchi, or wheezing.  Extremities: No cyanosis, clubbing, erythema, nor edema.   Neurological: Alert and oriented x 3.   Psychiatric:  Behavior, mood, and affect are appropriate.      Assessment and Plan:     The following treatment plan was discussed:  Reviewed urgent care visit notes and labs with patient answered all questions.      1. Encounter to establish care    2. Chronic fatigue  Chronic condition.  Discussed with patient at length multiple differential diagnoses that can cause chronic fatigue.  We will recheck testosterone levels between the hours of 8 and 10 AM as his previous ones were completed at 12 noon.  Patient does have previous history of sleep apnea and discussed further work-up from pulmonology for sleep study however patient declined at this " time.  Encourage patient to continue with exercise and ensure that he is eating a well-balanced diet.  Encourage good sleep habits.  Please see additional notes in insomnia.  - TESTOSTERONE SERUM; Future    3. Primary insomnia  Chronic issue for patient.  Discussed benefits, risks and alternatives to medication.  Emphasized importance of maintaining good sleep hygiene including: no caffeine after 3pm, no napping, using bedroom only for sleep or sex, no TV or phones before bed.  Discussed journaling  Trial of melatonin 3 mg prior to bed.   Continue to monitor.    4. Family history of prostate cancer  Up-to-date on PSA screenings.    5. Impaired fasting blood sugar  New condition.  We will continue to monitor.  Strong family history of diabetes.  Asymptomatic.    6. Screening for cardiovascular condition  - Lipid Profile; Future  Other orders  - finasteride (PROSCAR) 5 MG Tab; Take 5 mg by mouth every day.      Any change or worsening of signs or symptoms, patient encouraged to follow-up or report to urgent care or emergency room for further evaluation. Patient verbalizes understanding and agrees.    Follow-Up: Return if symptoms worsen or fail to improve.      PLEASE NOTE: This dictation was created using voice recognition software. I have made every reasonable attempt to correct obvious errors, but I expect that there are errors of grammar and possibly content that I did not discover before finalizing the note.

## 2022-05-19 NOTE — ASSESSMENT & PLAN NOTE
Reviewed most recent labs indicating mildly elevated fasting blood sugar.  Patient denies any symptoms of hyperglycemia.

## 2022-05-19 NOTE — ASSESSMENT & PLAN NOTE
Patient reports that his father  of prostate cancer at age of 45 and his paternal grandfather also had prostate cancer.    Reviewed labs with patient indicating normal PSA.  We will continue to monitor annually.

## 2022-05-19 NOTE — ASSESSMENT & PLAN NOTE
Very pleasant 41-year-old male presents today to establish care and discuss chronic fatigue and insomnia.  I reviewed and updated his medical history, surgical history, family history, medications, allergies, and social determinants of health.    Reviewed most recent urgent care visit notes and labs with patient and answered all questions.    Patient does report family history of diabetes on his mom side.  He does have strong family history of prostate cancer with his dad dying at the age of 65 for prostate cancer and his grandfather dying from complications due to prostate cancer.

## 2022-05-19 NOTE — ASSESSMENT & PLAN NOTE
Is a chronic condition.  Patient was most recently seen in urgent care and discussed chronic fatigue.  Reviewed labs that were ordered indicating normal thyroid function, no anemia, normal testosterone.    He does report insomnia symptoms.  Previous history of HANNA that was resolved with surgery.  Patient does report occasional snoring.

## 2022-10-17 ENCOUNTER — HOSPITAL ENCOUNTER (OUTPATIENT)
Dept: LAB | Facility: MEDICAL CENTER | Age: 42
End: 2022-10-17
Attending: NURSE PRACTITIONER
Payer: COMMERCIAL

## 2022-10-17 ENCOUNTER — OFFICE VISIT (OUTPATIENT)
Dept: MEDICAL GROUP | Facility: PHYSICIAN GROUP | Age: 42
End: 2022-10-17
Payer: COMMERCIAL

## 2022-10-17 VITALS
SYSTOLIC BLOOD PRESSURE: 118 MMHG | WEIGHT: 195.6 LBS | RESPIRATION RATE: 16 BRPM | OXYGEN SATURATION: 98 % | TEMPERATURE: 97.6 F | DIASTOLIC BLOOD PRESSURE: 66 MMHG | HEIGHT: 71 IN | HEART RATE: 61 BPM | BODY MASS INDEX: 27.38 KG/M2

## 2022-10-17 DIAGNOSIS — L85.3 DRY SKIN: ICD-10-CM

## 2022-10-17 DIAGNOSIS — Z23 NEED FOR VACCINATION: ICD-10-CM

## 2022-10-17 DIAGNOSIS — R53.82 CHRONIC FATIGUE: ICD-10-CM

## 2022-10-17 DIAGNOSIS — R73.01 IMPAIRED FASTING BLOOD SUGAR: ICD-10-CM

## 2022-10-17 LAB
ALBUMIN SERPL BCP-MCNC: 4.5 G/DL (ref 3.2–4.9)
ALBUMIN/GLOB SERPL: 1.5 G/DL
ALP SERPL-CCNC: 93 U/L (ref 30–99)
ALT SERPL-CCNC: 13 U/L (ref 2–50)
ANION GAP SERPL CALC-SCNC: 11 MMOL/L (ref 7–16)
AST SERPL-CCNC: 14 U/L (ref 12–45)
BILIRUB SERPL-MCNC: 0.2 MG/DL (ref 0.1–1.5)
BUN SERPL-MCNC: 22 MG/DL (ref 8–22)
CALCIUM SERPL-MCNC: 9.4 MG/DL (ref 8.5–10.5)
CHLORIDE SERPL-SCNC: 110 MMOL/L (ref 96–112)
CO2 SERPL-SCNC: 22 MMOL/L (ref 20–33)
CREAT SERPL-MCNC: 1.16 MG/DL (ref 0.5–1.4)
EST. AVERAGE GLUCOSE BLD GHB EST-MCNC: 114 MG/DL
GFR SERPLBLD CREATININE-BSD FMLA CKD-EPI: 80 ML/MIN/1.73 M 2
GLOBULIN SER CALC-MCNC: 3.1 G/DL (ref 1.9–3.5)
GLUCOSE SERPL-MCNC: 90 MG/DL (ref 65–99)
HBA1C MFR BLD: 5.6 % (ref 4–5.6)
POTASSIUM SERPL-SCNC: 4 MMOL/L (ref 3.6–5.5)
PROT SERPL-MCNC: 7.6 G/DL (ref 6–8.2)
SODIUM SERPL-SCNC: 143 MMOL/L (ref 135–145)
TESTOST SERPL-MCNC: 234 NG/DL (ref 175–781)

## 2022-10-17 PROCEDURE — 80053 COMPREHEN METABOLIC PANEL: CPT

## 2022-10-17 PROCEDURE — 36415 COLL VENOUS BLD VENIPUNCTURE: CPT

## 2022-10-17 PROCEDURE — 84403 ASSAY OF TOTAL TESTOSTERONE: CPT

## 2022-10-17 PROCEDURE — 83036 HEMOGLOBIN GLYCOSYLATED A1C: CPT

## 2022-10-17 PROCEDURE — 99214 OFFICE O/P EST MOD 30 MIN: CPT | Mod: 25 | Performed by: NURSE PRACTITIONER

## 2022-10-17 PROCEDURE — 90471 IMMUNIZATION ADMIN: CPT | Performed by: NURSE PRACTITIONER

## 2022-10-17 PROCEDURE — 90686 IIV4 VACC NO PRSV 0.5 ML IM: CPT | Performed by: NURSE PRACTITIONER

## 2022-10-17 ASSESSMENT — FIBROSIS 4 INDEX: FIB4 SCORE: 1.88

## 2022-10-17 NOTE — PROGRESS NOTES
"Chief Complaint   Patient presents with    Other     Pt is experiencing excessive dryness on skin all over the body. Pt states they use moisturizer and it isn't helping with the dryness. Skin is cracking from below knee and stops at ankles.        Subjective:     HPI:   Freddie Cuellar is a 42 y.o. male here to discuss the evaluation and management of:      Dry skin  Stated about one month ago with very dry skin on bilateral lower leg and forearms.   Over the past couple weeks has now spread to his trunk.  Currently using Shea butter lotion.  Does take warm showers.  Patient denies changing any detergents or soaps.  Only drinking about 4 bottles of water per day.  Does take creatine supplements.  Denies any polyuria, polyphagia, or polydipsia.  He denies any itching, redness, bleeding, or discharge.      ROS:  Gen: no fevers/chills, no changes in weight  Pulm: no sob, no cough  CV: no chest pain, no palpitations    Allergies   Allergen Reactions    Seasonal      Runny nose, watery eyes, scratchy throat       Current medicines (including changes today)  Current Outpatient Medications   Medication Sig Dispense Refill    finasteride (PROSCAR) 5 MG Tab Take 5 mg by mouth every day.       No current facility-administered medications for this visit.          Objective:       /66 (BP Location: Left arm, Patient Position: Sitting, BP Cuff Size: Adult)   Pulse 61   Temp 36.4 °C (97.6 °F) (Temporal)   Resp 16   Ht 1.803 m (5' 11\")   Wt 88.7 kg (195 lb 9.6 oz)   SpO2 98%  Body mass index is 27.28 kg/m².    Physical Exam:  Constitutional: Well-developed and well-nourished male in NAD. Not diaphoretic. No distress.   Skin: warm, dry, scaly, white flaky, nonpruritic in nature.  Negative for erythema, or discharge.  Cardiovascular: Regular rate and rhythm without murmur. Radial pulses are intact and equal bilaterally.  Pulmonary: Clear to ausculation. Normal effort. No rales, ronchi, or wheezing.   Extremities: No " cyanosis, clubbing, erythema, nor edema.  Dry skin noted from lower legs to upper thighs bilaterally.  Dry skin noted on bilateral forearms.  Neurological: Alert and oriented x 3.   Psychiatric:  Behavior, mood, and affect are appropriate.      Assessment and Plan:     The following treatment plan was discussed:    1. Dry skin  New condition.  Discussed with patient differential diagnosis included but not limited to atopic dermatitis, endocrine disorders, or possible fungal infection.  Previous fasting blood sugar was mildly elevated.  PSA level within normal range.  Patient denies symptoms of polyuria, polyphagia, or polydipsia.  Recommended patient start using emollient type lotions twice daily and especially right out of the shower, recommended using oil-based lotions at least once every other day while in the shower and allowing to air dry, lukewarm showers not hot showers recommended, increase water intake to 64 to 80 ounces per day.  Referrals placed today to dermatology.  Lab orders placed as indicated below.  Will continue to monitor.    - HEMOGLOBIN A1C; Future  - Referral to Dermatology  - Comp Metabolic Panel; Future    2. Impaired fasting blood sugar  Patient presents today with extremely dry skin.  We will screen for possible diabetes.  Lab orders indicated below.  Discussed appropriate diet lifestyle modifications including watching sugar intake.  - HEMOGLOBIN A1C; Future  - Comp Metabolic Panel; Future    3. Need for vaccination  - INFLUENZA VACCINE QUAD INJ (PF)      Any change or worsening of signs or symptoms, patient encouraged to follow-up or report to urgent care or emergency room for further evaluation. Patient verbalizes understanding and agrees.    Follow-Up: Return if symptoms worsen or fail to improve.      PLEASE NOTE: This dictation was created using voice recognition software. I have made every reasonable attempt to correct obvious errors, but I expect that there are errors of grammar  and possibly content that I did not discover before finalizing the note.

## 2022-10-17 NOTE — ASSESSMENT & PLAN NOTE
Stated about one month ago with very dry skin on bilateral lower leg and forearms.   Over the past couple weeks has now spread to his trunk.  Currently using Shea butter lotion.  Does take warm showers.  Patient denies changing any detergents or soaps.  Only drinking about 4 bottles of water per day.  Does take creatine supplements.  Denies any polyuria, polyphagia, or polydipsia.  He denies any itching, redness, bleeding, or discharge.

## 2022-11-08 ENCOUNTER — OFFICE VISIT (OUTPATIENT)
Dept: MEDICAL GROUP | Facility: PHYSICIAN GROUP | Age: 42
End: 2022-11-08
Payer: COMMERCIAL

## 2022-11-08 VITALS
OXYGEN SATURATION: 99 % | DIASTOLIC BLOOD PRESSURE: 62 MMHG | HEIGHT: 71 IN | TEMPERATURE: 97 F | BODY MASS INDEX: 27.72 KG/M2 | RESPIRATION RATE: 18 BRPM | WEIGHT: 198 LBS | HEART RATE: 58 BPM | SYSTOLIC BLOOD PRESSURE: 114 MMHG

## 2022-11-08 DIAGNOSIS — R53.82 CHRONIC FATIGUE: ICD-10-CM

## 2022-11-08 DIAGNOSIS — R73.01 IMPAIRED FASTING BLOOD SUGAR: ICD-10-CM

## 2022-11-08 DIAGNOSIS — L85.3 DRY SKIN: ICD-10-CM

## 2022-11-08 PROCEDURE — 99214 OFFICE O/P EST MOD 30 MIN: CPT | Performed by: NURSE PRACTITIONER

## 2022-11-08 ASSESSMENT — FIBROSIS 4 INDEX: FIB4 SCORE: 1.19

## 2022-11-08 NOTE — ASSESSMENT & PLAN NOTE
Impaired blood sugars have improved.  Patient is back and normal range.  A1c level 5.6%.  Patient denies polyuria, polydipsia, polyphagia.  Excessive dry skin has improved.

## 2022-11-08 NOTE — PATIENT INSTRUCTIONS
CRISTINA CARBAJAL  [facility]  [facility] [address]  Phone: [phone]

## 2022-11-08 NOTE — ASSESSMENT & PLAN NOTE
Patient continues to have chronic fatigue however reports it is not interfering with his ADLs.  Testosterone levels within normal range however on the lower end.  Discussed possible referral to endocrinology however patient declined at this time.

## 2022-11-08 NOTE — PROGRESS NOTES
"Chief Complaint   Patient presents with    Follow-Up     Labs        Subjective:     HPI:   Freddie Cuellar is a 42 y.o. male here to discuss the evaluation and management of:      Dry skin  Improving condition.  Patient has not been contacted by dermatologist referral that was placed at last appointment.  Referral information was given to patient discharge instructions.  Patient has been using Goldbond lotion, all of oil, and using less soap/showering less often.  Patient is also increase his water intake and is drinking 1 electrolyte sugar-free drink per day.    Impaired fasting blood sugar  Impaired blood sugars have improved.  Patient is back and normal range.  A1c level 5.6%.  Patient denies polyuria, polydipsia, polyphagia.  Excessive dry skin has improved.    Chronic fatigue  Patient continues to have chronic fatigue however reports it is not interfering with his ADLs.  Testosterone levels within normal range however on the lower end.  Discussed possible referral to endocrinology however patient declined at this time.        ROS:  Gen: no fevers/chills, no changes in weight  Pulm: no sob, no cough  CV: no chest pain, no palpitations  MSk: no myalgias  Neuro: no headaches, no numbness/tingling      Allergies   Allergen Reactions    Seasonal      Runny nose, watery eyes, scratchy throat       Current medicines (including changes today)  Current Outpatient Medications   Medication Sig Dispense Refill    finasteride (PROSCAR) 5 MG Tab Take 1 Tablet by mouth every day.       No current facility-administered medications for this visit.          Objective:       /62   Pulse (!) 58   Temp 36.1 °C (97 °F) (Temporal)   Resp 18   Ht 1.803 m (5' 11\")   Wt 89.8 kg (198 lb)   SpO2 99%  Body mass index is 27.62 kg/m².    Physical Exam:  Constitutional: Well-developed and well-nourished male in NAD. Not diaphoretic. No distress.   Skin: warm, dry scaly skin on bilateral upper extremities and bilateral lower " extremities, no erythema no discharge,, intact  Cardiovascular: Regular rate and rhythm without murmur. Radial pulses are intact and equal bilaterally.  Pulmonary: Clear to ausculation. Normal effort. No rales, ronchi, or wheezing.   Extremities: No cyanosis, clubbing, erythema, nor edema.   Neurological: Alert and oriented x 3.   Psychiatric:  Behavior, mood, and affect are appropriate.      Assessment and Plan:     The following treatment plan was discussed:  I have reviewed labs with patient and answered all questions.    1. Impaired fasting blood sugar  Labs have improved.  Discussed with patient low-carb and low sugar diet options.  Encourage patient to continue to stay well-hydrated and exercise regularly.  We will continue to monitor annually.    2. Chronic fatigue  Chronic condition.  Discussed with patient good sleep hygiene.  Discussed possible referral to endocrinology due to lower and levels of testosterone level however patient declined at this time.  Encourage patient to eat a well-balanced healthy diet and exercise regularly.  Patient does have good exercise routine.    3. Dry skin  Improving condition.  Patient will continue with emollient type lotions, all of oil, increasing water, and 0 sugar electrolyte drinks.  Referral information to dermatology were given in discharge instructions.        Any change or worsening of signs or symptoms, patient encouraged to follow-up or report to urgent care or emergency room for further evaluation. Patient verbalizes understanding and agrees.    Follow-Up: Return in about 1 year (around 11/8/2023) for Annual Wellness.      PLEASE NOTE: This dictation was created using voice recognition software. I have made every reasonable attempt to correct obvious errors, but I expect that there are errors of grammar and possibly content that I did not discover before finalizing the note.

## 2022-11-08 NOTE — ASSESSMENT & PLAN NOTE
Improving condition.  Patient has not been contacted by dermatologist referral that was placed at last appointment.  Referral information was given to patient discharge instructions.  Patient has been using Goldbond lotion, all of oil, and using less soap/showering less often.  Patient is also increase his water intake and is drinking 1 electrolyte sugar-free drink per day.

## 2023-01-03 ENCOUNTER — OFFICE VISIT (OUTPATIENT)
Dept: MEDICAL GROUP | Facility: PHYSICIAN GROUP | Age: 43
End: 2023-01-03
Payer: COMMERCIAL

## 2023-01-03 ENCOUNTER — HOSPITAL ENCOUNTER (OUTPATIENT)
Facility: MEDICAL CENTER | Age: 43
End: 2023-01-03
Attending: NURSE PRACTITIONER
Payer: COMMERCIAL

## 2023-01-03 VITALS
RESPIRATION RATE: 14 BRPM | TEMPERATURE: 96.9 F | DIASTOLIC BLOOD PRESSURE: 78 MMHG | WEIGHT: 197.8 LBS | BODY MASS INDEX: 27.69 KG/M2 | HEIGHT: 71 IN | HEART RATE: 65 BPM | OXYGEN SATURATION: 98 % | SYSTOLIC BLOOD PRESSURE: 118 MMHG

## 2023-01-03 DIAGNOSIS — M25.511 CHRONIC PAIN OF BOTH SHOULDERS: ICD-10-CM

## 2023-01-03 DIAGNOSIS — G89.29 CHRONIC PAIN OF BOTH SHOULDERS: ICD-10-CM

## 2023-01-03 DIAGNOSIS — M25.512 CHRONIC PAIN OF BOTH SHOULDERS: ICD-10-CM

## 2023-01-03 DIAGNOSIS — M25.521 BILATERAL ELBOW JOINT PAIN: ICD-10-CM

## 2023-01-03 DIAGNOSIS — M25.522 BILATERAL ELBOW JOINT PAIN: ICD-10-CM

## 2023-01-03 DIAGNOSIS — Z20.2 STD EXPOSURE: ICD-10-CM

## 2023-01-03 PROCEDURE — 87491 CHLMYD TRACH DNA AMP PROBE: CPT

## 2023-01-03 PROCEDURE — 87591 N.GONORRHOEAE DNA AMP PROB: CPT

## 2023-01-03 PROCEDURE — 99213 OFFICE O/P EST LOW 20 MIN: CPT | Performed by: NURSE PRACTITIONER

## 2023-01-03 ASSESSMENT — PATIENT HEALTH QUESTIONNAIRE - PHQ9: CLINICAL INTERPRETATION OF PHQ2 SCORE: 0

## 2023-01-03 ASSESSMENT — FIBROSIS 4 INDEX: FIB4 SCORE: 1.19

## 2023-01-04 LAB
C TRACH DNA SPEC QL NAA+PROBE: POSITIVE
N GONORRHOEA DNA SPEC QL NAA+PROBE: NEGATIVE
SPECIMEN SOURCE: ABNORMAL

## 2023-01-04 NOTE — ASSESSMENT & PLAN NOTE
Chronic condition.  Patient reports for approximately 1 year he has had bilateral shoulder pain.  He has been evaluated in the past and reports it was negative for any rotator cuff injury.  Patient does workout regularly and lifts weights and does arms once per week.    He denies pain at rest however pain is worse with working out.  He reports the pain is located in his anterior shoulder.  He denies any redness, heat, or swelling to the joints.  He denies any radiculopathy in upper extremities.  Has not tried any over-the-counter analgesics or cryotherapy.

## 2023-01-04 NOTE — PROGRESS NOTES
Chief Complaint   Patient presents with    Shoulder Pain     Bilateral Shoulder pain, worse on L and Bilateral Elbows     Sexually Transmitted Diseases     Patient would like to have some STD testing done        Subjective:     HPI:   Freddie Cuellar is a 42 y.o. male here to discuss the evaluation and management of:      STD exposure  Patient reports that he was recently exposed to chlamydia.  He reports approximately 3 to 4 weeks ago he had unprotected sex with a female who tested positive for chlamydia.  Patient denies any symptoms including any penile discharge, testicular pain, swelling, or dysuria.  Patient is requesting testing for gonorrhea and chlamydia    Bilateral shoulder pain  Chronic condition.  Patient reports for approximately 1 year he has had bilateral shoulder pain.  He has been evaluated in the past and reports it was negative for any rotator cuff injury.  Patient does workout regularly and lifts weights and does arms once per week.    He denies pain at rest however pain is worse with working out.  He reports the pain is located in his anterior shoulder.  He denies any redness, heat, or swelling to the joints.  He denies any radiculopathy in upper extremities.  Has not tried any over-the-counter analgesics or cryotherapy.    Bilateral elbow joint pain  Patient reports that he has had bilateral elbow pain for approximately 1 year left greater than right.  Patient reports that he is right-hand dominant.  He denies any radiculopathy into his upper extremities.  He denies any reproducible pain with palpation.  Indicates that the pain is worse when he lifts weights on his ulnar collateral ligaments.  Denies any redness, heat, or swelling to his elbow joints.  Has not tried any over-the-counter analgesics.  Is not currently icing joints after workouts.      ROS:  Gen: no fevers/chills, no changes in weight  Pulm: no sob, no cough  CV: no chest pain, no palpitations  GI: no nausea/vomiting, no  "diarrhea  MSk: Positive HPI    Allergies   Allergen Reactions    Seasonal      Runny nose, watery eyes, scratchy throat       Current medicines (including changes today)  Current Outpatient Medications   Medication Sig Dispense Refill    diclofenac sodium (VOLTAREN) 1 % Gel Apply 2 g topically 2 times a day. 100 g 3    finasteride (PROSCAR) 5 MG Tab Take 1 Tablet by mouth every day.       No current facility-administered medications for this visit.          Objective:       /78   Pulse 65   Temp 36.1 °C (96.9 °F) (Temporal)   Resp 14   Ht 1.803 m (5' 11\")   Wt 89.7 kg (197 lb 12.8 oz)   SpO2 98%  Body mass index is 27.59 kg/m².    Physical Exam:  Constitutional: Well-developed and well-nourished male in NAD. Not diaphoretic. No distress.   Skin: warm, dry, intact  Cardiovascular: Regular rate and rhythm without murmur. Radial pulses are intact and equal bilaterally.  Pulmonary: Clear to ausculation. Normal effort. No rales, ronchi, or wheezing.  Bilateral shoulder: Full ROM, full strength, negative for TTP, empty can test negative, drop arm test negative  Elbow: No deformity, swelling or bruising noted.  No tenderness to palpation. Full range of motion bilaterally. 2+ biceps, triceps and brachioradialis deep tendon reflexes bilaterally. 5/5 strength bilaterally.  Neurological: Alert and oriented x 3.   Psychiatric:  Behavior, mood, and affect are appropriate.      Assessment and Plan:     1. STD exposure  New condition.  Discussed safe sex practice.  Recommended screening for syphilis and HIV however patient declined.  Urine collected today and will run chlamydia and GC.  [facility] and treat [facility].  - Chlamydia/GC, PCR (Urine); Future    2. Chronic pain of both shoulders  Acute on chronic condition.  At length conversation with patient about tendinitis in bilateral shoulder joints.  Unlikely rotator cuff injury as patient's physical exam findings were negative.  " Discussed with patient physical therapy however he declined at this time.  Patient will start with rest, cryotherapy, and diclofenac gel as needed.  Recommended no weight lifting for 6 to 8 weeks on upper extremities, may do nonweight exercises.  - diclofenac sodium (VOLTAREN) 1 % Gel; Apply 2 g topically 2 times a day.  Dispense: 100 g; Refill: 3    3. Bilateral elbow joint pain  Acute on chronic condition.  Most likely tendinitis to ulnar collateral ligament.  Discussed with patient physical therapy however he declined at this time.  Patient will start with rest, cryotherapy, and diclofenac gel as needed.  Recommended no weight lifting for 6 to 8 weeks on upper extremities, may do nonweight exercises.  - diclofenac sodium (VOLTAREN) 1 % Gel; Apply 2 g topically 2 times a day.  Dispense: 100 g; Refill: 3      Any change or worsening of signs or symptoms, patient encouraged to follow-up or report to urgent care or emergency room for further evaluation. Patient verbalizes understanding and agrees.    Follow-Up: Return in about 4 months (around 5/3/2023) for est care .      PLEASE NOTE: This dictation was created using voice recognition software. I have made every reasonable attempt to correct obvious errors, but I expect that there are errors of grammar and possibly content that I did not discover before finalizing the note.

## 2023-01-04 NOTE — ASSESSMENT & PLAN NOTE
Patient reports that he was recently exposed to chlamydia.  He reports approximately 3 to 4 weeks ago he had unprotected sex with a female who tested positive for chlamydia.  Patient denies any symptoms including any penile discharge, testicular pain, swelling, or dysuria.  Patient is requesting testing for gonorrhea and chlamydia

## 2023-01-04 NOTE — ASSESSMENT & PLAN NOTE
Patient reports that he has had bilateral elbow pain for approximately 1 year left greater than right.  Patient reports that he is right-hand dominant.  He denies any radiculopathy into his upper extremities.  He denies any reproducible pain with palpation.  Indicates that the pain is worse when he lifts weights on his ulnar collateral ligaments.  Denies any redness, heat, or swelling to his elbow joints.  Has not tried any over-the-counter analgesics.  Is not currently icing joints after workouts.

## 2023-01-05 DIAGNOSIS — A56.8 CHLAMYDIA TRACHOMATIS INFECTION: ICD-10-CM

## 2023-01-05 RX ORDER — DOXYCYCLINE HYCLATE 100 MG
100 TABLET ORAL 2 TIMES DAILY
Qty: 14 TABLET | Refills: 0 | Status: SHIPPED | OUTPATIENT
Start: 2023-01-05 | End: 2023-01-12

## 2023-01-12 ENCOUNTER — OFFICE VISIT (OUTPATIENT)
Dept: URGENT CARE | Facility: PHYSICIAN GROUP | Age: 43
End: 2023-01-12
Payer: COMMERCIAL

## 2023-01-12 VITALS
OXYGEN SATURATION: 98 % | DIASTOLIC BLOOD PRESSURE: 86 MMHG | RESPIRATION RATE: 16 BRPM | HEART RATE: 56 BPM | TEMPERATURE: 98.7 F | SYSTOLIC BLOOD PRESSURE: 112 MMHG | HEIGHT: 71 IN | BODY MASS INDEX: 27.58 KG/M2 | WEIGHT: 197 LBS

## 2023-01-12 DIAGNOSIS — M25.511 CHRONIC PAIN OF BOTH SHOULDERS: ICD-10-CM

## 2023-01-12 DIAGNOSIS — M25.512 CHRONIC PAIN OF BOTH SHOULDERS: ICD-10-CM

## 2023-01-12 DIAGNOSIS — G89.29 CHRONIC PAIN OF BOTH SHOULDERS: ICD-10-CM

## 2023-01-12 DIAGNOSIS — Z86.19 HISTORY OF CHLAMYDIA: ICD-10-CM

## 2023-01-12 DIAGNOSIS — M25.522 BILATERAL ELBOW JOINT PAIN: ICD-10-CM

## 2023-01-12 DIAGNOSIS — M25.521 BILATERAL ELBOW JOINT PAIN: ICD-10-CM

## 2023-01-12 PROCEDURE — 99213 OFFICE O/P EST LOW 20 MIN: CPT | Performed by: PHYSICIAN ASSISTANT

## 2023-01-12 RX ORDER — DOXYCYCLINE HYCLATE 100 MG/1
CAPSULE ORAL
COMMUNITY
Start: 2023-01-05 | End: 2023-01-12

## 2023-01-12 ASSESSMENT — ENCOUNTER SYMPTOMS
VOMITING: 0
NAUSEA: 0
DIARRHEA: 0
CONSTIPATION: 0
EYE PAIN: 0
ABDOMINAL PAIN: 0
CHILLS: 0
MYALGIAS: 0
SORE THROAT: 0
FEVER: 0
SHORTNESS OF BREATH: 0
HEADACHES: 0
COUGH: 0

## 2023-01-12 ASSESSMENT — PAIN SCALES - GENERAL: PAINLEVEL: NO PAIN

## 2023-01-12 ASSESSMENT — FIBROSIS 4 INDEX: FIB4 SCORE: 1.19

## 2023-01-12 NOTE — PROGRESS NOTES
"Subjective:   Freddie Cuellar is a 42 y.o. male who presents for Shoulder Pain (Bilateral ) and Sexually Transmitted Diseases      42-year-old male presents urgent care for 2 separate issues.  Recently completed treatment for chlamydial infection and wants a retest to ensure resolution.  Not currently symptomatic.  He is also noted improving bilateral shoulder pain, pain is worse with working out as well as some posterior tricep discomfort worse with working out and is wondering whether he would benefit from physical therapy versus further interventions.  He is noted benefit with topical diclofenac.  No new trauma or injury    Review of Systems   Constitutional:  Negative for chills and fever.   HENT:  Negative for congestion, ear pain and sore throat.    Eyes:  Negative for pain.   Respiratory:  Negative for cough and shortness of breath.    Cardiovascular:  Negative for chest pain.   Gastrointestinal:  Negative for abdominal pain, constipation, diarrhea, nausea and vomiting.   Genitourinary:  Negative for dysuria.   Musculoskeletal:  Negative for myalgias.   Skin:  Negative for rash.   Neurological:  Negative for headaches.     Medications, Allergies, and current problem list reviewed today in Epic.     Objective:     /86   Pulse (!) 56   Temp 37.1 °C (98.7 °F) (Temporal)   Resp 16   Ht 1.803 m (5' 11\")   Wt 89.4 kg (197 lb)   SpO2 98%     Physical Exam  Vitals reviewed.   Constitutional:       Appearance: Normal appearance.   HENT:      Head: Normocephalic and atraumatic.      Right Ear: External ear normal.      Left Ear: External ear normal.      Nose: Nose normal.      Mouth/Throat:      Mouth: Mucous membranes are moist.   Eyes:      Conjunctiva/sclera: Conjunctivae normal.   Cardiovascular:      Rate and Rhythm: Normal rate.   Pulmonary:      Effort: Pulmonary effort is normal.   Musculoskeletal:      Comments: Mild tenderness with range of motion over the triceps tendons bilaterally right " greater than left.  Trace tenderness over bicipital grooves bilaterally right greater than left.  Normal function testing of rotator cuffs bilaterally, full range of motion   Skin:     General: Skin is warm and dry.      Capillary Refill: Capillary refill takes less than 2 seconds.   Neurological:      Mental Status: He is alert and oriented to person, place, and time.       Assessment/Plan:     Diagnosis and associated orders:     1. History of chlamydia  Chlamydia/GC, PCR (Urine)      2. Chronic pain of both shoulders  Referral to Sports Medicine      3. Bilateral elbow joint pain  Referral to Sports Medicine         Comments/MDM:     Patient too early for test of cure, recommend urine sample be collected in 4 to 6 weeks at the soonest.  Referral placed to sports medicine, patient provided with clinical advisor paperwork discussing exercises and stretches to help with his triceps tendinitis and biceps tendinitis which I suspect are the underlying etiology.         Differential diagnosis, natural history, supportive care, and indications for immediate follow-up discussed.    Advised the patient to follow-up with the primary care physician for recheck, reevaluation, and consideration of further management.    Please note that this dictation was created using voice recognition software. I have made a reasonable attempt to correct obvious errors, but I expect that there are errors of grammar and possibly content that I did not discover before finalizing the note.    This note was electronically signed by Lenny Suarez PA-C

## 2023-02-13 ENCOUNTER — HOSPITAL ENCOUNTER (OUTPATIENT)
Dept: LAB | Facility: MEDICAL CENTER | Age: 43
End: 2023-02-13
Attending: PHYSICIAN ASSISTANT
Payer: COMMERCIAL

## 2023-02-13 DIAGNOSIS — Z86.19 HISTORY OF CHLAMYDIA: ICD-10-CM

## 2023-02-13 DIAGNOSIS — Z13.6 SCREENING FOR CARDIOVASCULAR CONDITION: ICD-10-CM

## 2023-02-13 LAB
CHOLEST SERPL-MCNC: 159 MG/DL (ref 100–199)
FASTING STATUS PATIENT QL REPORTED: NORMAL
HDLC SERPL-MCNC: 69 MG/DL
LDLC SERPL CALC-MCNC: 82 MG/DL
TRIGL SERPL-MCNC: 41 MG/DL (ref 0–149)

## 2023-02-13 PROCEDURE — 80061 LIPID PANEL: CPT

## 2023-02-13 PROCEDURE — 87591 N.GONORRHOEAE DNA AMP PROB: CPT

## 2023-02-13 PROCEDURE — 87491 CHLMYD TRACH DNA AMP PROBE: CPT

## 2023-02-13 PROCEDURE — 36415 COLL VENOUS BLD VENIPUNCTURE: CPT

## 2023-05-25 ENCOUNTER — TELEPHONE (OUTPATIENT)
Dept: HEALTH INFORMATION MANAGEMENT | Facility: OTHER | Age: 43
End: 2023-05-25
Payer: COMMERCIAL

## 2023-07-29 SDOH — HEALTH STABILITY: MENTAL HEALTH
STRESS IS WHEN SOMEONE FEELS TENSE, NERVOUS, ANXIOUS, OR CAN'T SLEEP AT NIGHT BECAUSE THEIR MIND IS TROUBLED. HOW STRESSED ARE YOU?: VERY MUCH

## 2023-07-29 SDOH — ECONOMIC STABILITY: INCOME INSECURITY: IN THE LAST 12 MONTHS, WAS THERE A TIME WHEN YOU WERE NOT ABLE TO PAY THE MORTGAGE OR RENT ON TIME?: NO

## 2023-07-29 SDOH — ECONOMIC STABILITY: TRANSPORTATION INSECURITY
IN THE PAST 12 MONTHS, HAS LACK OF RELIABLE TRANSPORTATION KEPT YOU FROM MEDICAL APPOINTMENTS, MEETINGS, WORK OR FROM GETTING THINGS NEEDED FOR DAILY LIVING?: NO

## 2023-07-29 SDOH — HEALTH STABILITY: PHYSICAL HEALTH: ON AVERAGE, HOW MANY MINUTES DO YOU ENGAGE IN EXERCISE AT THIS LEVEL?: 50 MIN

## 2023-07-29 SDOH — ECONOMIC STABILITY: HOUSING INSECURITY
IN THE LAST 12 MONTHS, WAS THERE A TIME WHEN YOU DID NOT HAVE A STEADY PLACE TO SLEEP OR SLEPT IN A SHELTER (INCLUDING NOW)?: NO

## 2023-07-29 SDOH — ECONOMIC STABILITY: FOOD INSECURITY: WITHIN THE PAST 12 MONTHS, YOU WORRIED THAT YOUR FOOD WOULD RUN OUT BEFORE YOU GOT MONEY TO BUY MORE.: NEVER TRUE

## 2023-07-29 SDOH — ECONOMIC STABILITY: HOUSING INSECURITY: IN THE LAST 12 MONTHS, HOW MANY PLACES HAVE YOU LIVED?: 1

## 2023-07-29 SDOH — ECONOMIC STABILITY: TRANSPORTATION INSECURITY
IN THE PAST 12 MONTHS, HAS LACK OF TRANSPORTATION KEPT YOU FROM MEETINGS, WORK, OR FROM GETTING THINGS NEEDED FOR DAILY LIVING?: NO

## 2023-07-29 SDOH — HEALTH STABILITY: PHYSICAL HEALTH: ON AVERAGE, HOW MANY DAYS PER WEEK DO YOU ENGAGE IN MODERATE TO STRENUOUS EXERCISE (LIKE A BRISK WALK)?: 5 DAYS

## 2023-07-29 SDOH — ECONOMIC STABILITY: FOOD INSECURITY: WITHIN THE PAST 12 MONTHS, THE FOOD YOU BOUGHT JUST DIDN'T LAST AND YOU DIDN'T HAVE MONEY TO GET MORE.: NEVER TRUE

## 2023-07-29 SDOH — ECONOMIC STABILITY: TRANSPORTATION INSECURITY
IN THE PAST 12 MONTHS, HAS THE LACK OF TRANSPORTATION KEPT YOU FROM MEDICAL APPOINTMENTS OR FROM GETTING MEDICATIONS?: NO

## 2023-07-29 SDOH — ECONOMIC STABILITY: INCOME INSECURITY: HOW HARD IS IT FOR YOU TO PAY FOR THE VERY BASICS LIKE FOOD, HOUSING, MEDICAL CARE, AND HEATING?: NOT HARD AT ALL

## 2023-07-29 ASSESSMENT — LIFESTYLE VARIABLES
HOW MANY STANDARD DRINKS CONTAINING ALCOHOL DO YOU HAVE ON A TYPICAL DAY: PATIENT DOES NOT DRINK
HOW OFTEN DO YOU HAVE A DRINK CONTAINING ALCOHOL: NEVER
SKIP TO QUESTIONS 9-10: 1
AUDIT-C TOTAL SCORE: 0
HOW OFTEN DO YOU HAVE SIX OR MORE DRINKS ON ONE OCCASION: NEVER

## 2023-07-29 ASSESSMENT — SOCIAL DETERMINANTS OF HEALTH (SDOH)
IN A TYPICAL WEEK, HOW MANY TIMES DO YOU TALK ON THE PHONE WITH FAMILY, FRIENDS, OR NEIGHBORS?: THREE TIMES A WEEK
HOW OFTEN DO YOU ATTEND CHURCH OR RELIGIOUS SERVICES?: NEVER
DO YOU BELONG TO ANY CLUBS OR ORGANIZATIONS SUCH AS CHURCH GROUPS UNIONS, FRATERNAL OR ATHLETIC GROUPS, OR SCHOOL GROUPS?: NO
HOW OFTEN DO YOU GET TOGETHER WITH FRIENDS OR RELATIVES?: ONCE A WEEK
HOW OFTEN DO YOU HAVE A DRINK CONTAINING ALCOHOL: NEVER
HOW OFTEN DO YOU ATTENT MEETINGS OF THE CLUB OR ORGANIZATION YOU BELONG TO?: NEVER
IN A TYPICAL WEEK, HOW MANY TIMES DO YOU TALK ON THE PHONE WITH FAMILY, FRIENDS, OR NEIGHBORS?: THREE TIMES A WEEK
HOW OFTEN DO YOU HAVE SIX OR MORE DRINKS ON ONE OCCASION: NEVER
HOW OFTEN DO YOU ATTEND CHURCH OR RELIGIOUS SERVICES?: NEVER
HOW OFTEN DO YOU ATTENT MEETINGS OF THE CLUB OR ORGANIZATION YOU BELONG TO?: NEVER
HOW OFTEN DO YOU GET TOGETHER WITH FRIENDS OR RELATIVES?: ONCE A WEEK
HOW HARD IS IT FOR YOU TO PAY FOR THE VERY BASICS LIKE FOOD, HOUSING, MEDICAL CARE, AND HEATING?: NOT HARD AT ALL
HOW MANY DRINKS CONTAINING ALCOHOL DO YOU HAVE ON A TYPICAL DAY WHEN YOU ARE DRINKING: PATIENT DOES NOT DRINK
WITHIN THE PAST 12 MONTHS, YOU WORRIED THAT YOUR FOOD WOULD RUN OUT BEFORE YOU GOT THE MONEY TO BUY MORE: NEVER TRUE
DO YOU BELONG TO ANY CLUBS OR ORGANIZATIONS SUCH AS CHURCH GROUPS UNIONS, FRATERNAL OR ATHLETIC GROUPS, OR SCHOOL GROUPS?: NO

## 2023-08-01 ENCOUNTER — OFFICE VISIT (OUTPATIENT)
Dept: MEDICAL GROUP | Facility: PHYSICIAN GROUP | Age: 43
End: 2023-08-01
Payer: COMMERCIAL

## 2023-08-01 VITALS
OXYGEN SATURATION: 97 % | SYSTOLIC BLOOD PRESSURE: 122 MMHG | RESPIRATION RATE: 18 BRPM | HEART RATE: 62 BPM | HEIGHT: 71 IN | BODY MASS INDEX: 27.02 KG/M2 | DIASTOLIC BLOOD PRESSURE: 74 MMHG | WEIGHT: 193 LBS | TEMPERATURE: 97.5 F

## 2023-08-01 DIAGNOSIS — Z86.19 HISTORY OF CHLAMYDIA INFECTION: ICD-10-CM

## 2023-08-01 DIAGNOSIS — E55.9 VITAMIN D DEFICIENCY: ICD-10-CM

## 2023-08-01 DIAGNOSIS — Z12.5 ENCOUNTER FOR SCREENING FOR MALIGNANT NEOPLASM OF PROSTATE: ICD-10-CM

## 2023-08-01 DIAGNOSIS — R53.82 CHRONIC FATIGUE: ICD-10-CM

## 2023-08-01 DIAGNOSIS — R73.01 IMPAIRED FASTING BLOOD SUGAR: ICD-10-CM

## 2023-08-01 DIAGNOSIS — M25.512 CHRONIC PAIN OF BOTH SHOULDERS: ICD-10-CM

## 2023-08-01 DIAGNOSIS — N52.9 ERECTILE DYSFUNCTION, UNSPECIFIED ERECTILE DYSFUNCTION TYPE: ICD-10-CM

## 2023-08-01 DIAGNOSIS — M25.511 CHRONIC PAIN OF BOTH SHOULDERS: ICD-10-CM

## 2023-08-01 DIAGNOSIS — Z13.220 SCREENING FOR LIPID DISORDERS: ICD-10-CM

## 2023-08-01 DIAGNOSIS — Z80.42 FAMILY HISTORY OF PROSTATE CANCER: ICD-10-CM

## 2023-08-01 DIAGNOSIS — F51.01 PRIMARY INSOMNIA: ICD-10-CM

## 2023-08-01 DIAGNOSIS — Z80.42 FAMILY HISTORY OF PROSTATE CANCER IN FATHER: ICD-10-CM

## 2023-08-01 DIAGNOSIS — Z20.2 STD EXPOSURE: ICD-10-CM

## 2023-08-01 DIAGNOSIS — Z13.1 SCREENING FOR DIABETES MELLITUS: ICD-10-CM

## 2023-08-01 DIAGNOSIS — Z11.3 SCREENING EXAMINATION FOR SEXUALLY TRANSMITTED DISEASE: ICD-10-CM

## 2023-08-01 DIAGNOSIS — Z11.59 NEED FOR HEPATITIS C SCREENING TEST: ICD-10-CM

## 2023-08-01 DIAGNOSIS — G89.29 CHRONIC PAIN OF BOTH SHOULDERS: ICD-10-CM

## 2023-08-01 PROBLEM — M25.521 BILATERAL ELBOW JOINT PAIN: Status: RESOLVED | Noted: 2023-01-03 | Resolved: 2023-08-01

## 2023-08-01 PROBLEM — L85.3 DRY SKIN: Status: RESOLVED | Noted: 2022-10-17 | Resolved: 2023-08-01

## 2023-08-01 PROBLEM — M25.522 BILATERAL ELBOW JOINT PAIN: Status: RESOLVED | Noted: 2023-01-03 | Resolved: 2023-08-01

## 2023-08-01 PROCEDURE — 3078F DIAST BP <80 MM HG: CPT | Performed by: NURSE PRACTITIONER

## 2023-08-01 PROCEDURE — 3074F SYST BP LT 130 MM HG: CPT | Performed by: NURSE PRACTITIONER

## 2023-08-01 PROCEDURE — 99214 OFFICE O/P EST MOD 30 MIN: CPT | Performed by: NURSE PRACTITIONER

## 2023-08-01 RX ORDER — HYDROCODONE BITARTRATE AND ACETAMINOPHEN 10; 325 MG/1; MG/1
1 TABLET ORAL EVERY 6 HOURS PRN
COMMUNITY
Start: 2023-07-26 | End: 2023-08-01

## 2023-08-01 RX ORDER — MINOXIDIL 2.5 MG/1
1.25 TABLET ORAL DAILY
COMMUNITY
Start: 2023-07-05 | End: 2024-01-10

## 2023-08-01 RX ORDER — IBUPROFEN 800 MG/1
800 TABLET ORAL EVERY 6 HOURS PRN
COMMUNITY
Start: 2023-07-26 | End: 2023-09-05

## 2023-08-01 RX ORDER — CHLORHEXIDINE GLUCONATE ORAL RINSE 1.2 MG/ML
SOLUTION DENTAL
COMMUNITY
Start: 2023-07-26 | End: 2023-09-05

## 2023-08-01 RX ORDER — AMOXICILLIN 500 MG/1
TABLET, FILM COATED ORAL
COMMUNITY
Start: 2023-07-26 | End: 2023-08-01

## 2023-08-01 ASSESSMENT — FIBROSIS 4 INDEX: FIB4 SCORE: 1.19

## 2023-08-01 NOTE — ASSESSMENT & PLAN NOTE
improved since he started lifting ligther weights and working on  building more muscle   Still stiff; has to warm up before playing sports

## 2023-08-01 NOTE — ASSESSMENT & PLAN NOTE
Pt reports difficulty losing weight, despite working out, low carb eating, drinking water   his ideal weight is 185 lbs

## 2023-08-01 NOTE — ASSESSMENT & PLAN NOTE
Reports fatigue daily  testosterone levels have been done in the past twice and were on the low end of normal (mid 200s)  will complete labs and advise after results are back

## 2023-08-01 NOTE — PROGRESS NOTES
"Subjective:     CC:   Chief Complaint   Patient presents with    [facility]    Requesting Labs       HPI:   Freddie presents today with    Bilateral shoulder pain  improved since he started lifting ligther weights and working on  building more muscle   Still stiff; has to warm up before playing sports     Chronic fatigue  Reports fatigue daily  testosterone levels have been done in the past twice and were on the low end of normal (mid 200s)  will complete labs and advise after results are back    Family history of prostate cancer  Strong fam hx in father, paternal grandfather and paternal great grandfather     Impaired fasting blood sugar  Pt reports difficulty losing weight, despite working out, low carb eating, drinking water   his ideal weight is 185 lbs    Insomnia  Sleeps ok most of the time    STD exposure  Was treated; resolved; test of cure in the spring done-negative               ROS per HPI    Objective:     Exam:  /74   Pulse 62   Temp 36.4 °C (97.5 °F) (Temporal)   Resp 18   Ht 1.803 m (5' 11\")   Wt 87.5 kg (193 lb)   SpO2 97%   BMI 26.92 kg/m²  Body mass index is 26.92 kg/m².    Physical Exam:  Constitutional: Well-developed and well-nourished male. Not diaphoretic. No distress.   Skin: warm, dry, intact, no evidence of rash or concerning lesions  Head: Atraumatic without lesions.  Eyes: Conjunctivae are normal. Pupils are equal, round. No scleral icterus.   Ears:  External ears unremarkable.   Neck: Supple, trachea midline. No thyromegaly present. No cervical or supraclavicular lymphadenopathy.  Cardiovascular: Regular rate and rhythm without murmur.   Pulmonary: Clear to ausculation. Normal effort. No rales, ronchi, or wheezing.  Extremities: No cyanosis, clubbing, erythema, nor edema.   Neurological: Alert and oriented x 3.   Psychiatric:  Behavior, mood, and affect are appropriate.        Assessment & Plan:     42 y.o. male with the following -     1. Screening examination for " sexually transmitted disease  - HIV AG/AB COMBO ASSAY SCREENING; Future  - HSV 1/2 IGG W/ TYPE SPECIFIC RFLX; Future  - T.PALLIDUM AB FARHAN (SCREENING); Future  - Chlamydia/GC, PCR (Urine); Future    2. Chronic fatigue  - Comp Metabolic Panel; Future  - CBC WITH DIFFERENTIAL; Future  - TSH WITH REFLEX TO FT4; Future  - Testosterone, Free & Total, Adult Male (w/SHBG); Future    3. Impaired fasting blood sugar  - INSULIN FASTING; Future    4. Screening for diabetes mellitus  - HEMOGLOBIN A1C; Future    5. Screening for lipid disorders    6. History of chlamydia infection  - Chlamydia/GC, PCR (Urine); Future    7. Vitamin D deficiency  - VITAMIN D,25 HYDROXY (DEFICIENCY); Future    8. Need for hepatitis C screening test  - HEP C VIRUS ANTIBODY; Future    9. Erectile dysfunction, unspecified erectile dysfunction type  - Testosterone, Free & Total, Adult Male (w/SHBG); Future    10. Encounter for screening for malignant neoplasm of prostate  - PROSTATE SPECIFIC AG SCREENING; Future    11. Family history of prostate cancer in father  - PROSTATE SPECIFIC AG SCREENING; Future        Other orders  - chlorhexidine (PERIDEX) 0.12 % Solution; SWISH AND SPIT 15 ML BY MOUTH TWICE DAILY  - ibuprofen (MOTRIN) 800 MG Tab; Take 800 mg by mouth every 6 hours as needed.  FOR PAIN  - minoxidil (LONITEN) 2.5 MG Tab; Take 1.25 mg by mouth every day.         Return in about 5 weeks (around 9/5/2023) for lab results.    Please note that this dictation was created using voice recognition software. I have made every reasonable attempt to correct obvious errors, but I expect that there are errors of grammar and possibly content that I did not discover before finalizing the note.

## 2023-08-02 ENCOUNTER — HOSPITAL ENCOUNTER (OUTPATIENT)
Dept: LAB | Facility: MEDICAL CENTER | Age: 43
End: 2023-08-02
Attending: NURSE PRACTITIONER
Payer: COMMERCIAL

## 2023-08-02 DIAGNOSIS — Z86.19 HISTORY OF CHLAMYDIA INFECTION: ICD-10-CM

## 2023-08-02 DIAGNOSIS — E55.9 VITAMIN D DEFICIENCY: ICD-10-CM

## 2023-08-02 DIAGNOSIS — Z80.42 FAMILY HISTORY OF PROSTATE CANCER IN FATHER: ICD-10-CM

## 2023-08-02 DIAGNOSIS — R53.82 CHRONIC FATIGUE: ICD-10-CM

## 2023-08-02 DIAGNOSIS — N52.9 ERECTILE DYSFUNCTION, UNSPECIFIED ERECTILE DYSFUNCTION TYPE: ICD-10-CM

## 2023-08-02 DIAGNOSIS — Z12.5 ENCOUNTER FOR SCREENING FOR MALIGNANT NEOPLASM OF PROSTATE: ICD-10-CM

## 2023-08-02 DIAGNOSIS — Z11.59 NEED FOR HEPATITIS C SCREENING TEST: ICD-10-CM

## 2023-08-02 DIAGNOSIS — Z13.1 SCREENING FOR DIABETES MELLITUS: ICD-10-CM

## 2023-08-02 DIAGNOSIS — R73.01 IMPAIRED FASTING BLOOD SUGAR: ICD-10-CM

## 2023-08-02 DIAGNOSIS — Z11.3 SCREENING EXAMINATION FOR SEXUALLY TRANSMITTED DISEASE: ICD-10-CM

## 2023-08-02 LAB
25(OH)D3 SERPL-MCNC: 20 NG/ML (ref 30–100)
ALBUMIN SERPL BCP-MCNC: 4.4 G/DL (ref 3.2–4.9)
ALBUMIN/GLOB SERPL: 1.5 G/DL
ALP SERPL-CCNC: 81 U/L (ref 30–99)
ALT SERPL-CCNC: 14 U/L (ref 2–50)
ANION GAP SERPL CALC-SCNC: 9 MMOL/L (ref 7–16)
AST SERPL-CCNC: 17 U/L (ref 12–45)
BASOPHILS # BLD AUTO: 0.8 % (ref 0–1.8)
BASOPHILS # BLD: 0.03 K/UL (ref 0–0.12)
BILIRUB SERPL-MCNC: 0.4 MG/DL (ref 0.1–1.5)
BUN SERPL-MCNC: 18 MG/DL (ref 8–22)
C TRACH DNA SPEC QL NAA+PROBE: NEGATIVE
CALCIUM ALBUM COR SERPL-MCNC: 9.6 MG/DL (ref 8.5–10.5)
CALCIUM SERPL-MCNC: 9.9 MG/DL (ref 8.5–10.5)
CHLORIDE SERPL-SCNC: 107 MMOL/L (ref 96–112)
CO2 SERPL-SCNC: 23 MMOL/L (ref 20–33)
CREAT SERPL-MCNC: 1.35 MG/DL (ref 0.5–1.4)
EOSINOPHIL # BLD AUTO: 0.04 K/UL (ref 0–0.51)
EOSINOPHIL NFR BLD: 1 % (ref 0–6.9)
ERYTHROCYTE [DISTWIDTH] IN BLOOD BY AUTOMATED COUNT: 42.9 FL (ref 35.9–50)
EST. AVERAGE GLUCOSE BLD GHB EST-MCNC: 114 MG/DL
GFR SERPLBLD CREATININE-BSD FMLA CKD-EPI: 66 ML/MIN/1.73 M 2
GLOBULIN SER CALC-MCNC: 3 G/DL (ref 1.9–3.5)
GLUCOSE SERPL-MCNC: 91 MG/DL (ref 65–99)
HBA1C MFR BLD: 5.6 % (ref 4–5.6)
HCT VFR BLD AUTO: 42.8 % (ref 42–52)
HCV AB SER QL: NORMAL
HGB BLD-MCNC: 14.8 G/DL (ref 14–18)
HIV 1+2 AB+HIV1 P24 AG SERPL QL IA: NORMAL
IMM GRANULOCYTES # BLD AUTO: 0.02 K/UL (ref 0–0.11)
IMM GRANULOCYTES NFR BLD AUTO: 0.5 % (ref 0–0.9)
LYMPHOCYTES # BLD AUTO: 1.49 K/UL (ref 1–4.8)
LYMPHOCYTES NFR BLD: 38.5 % (ref 22–41)
MCH RBC QN AUTO: 28.2 PG (ref 27–33)
MCHC RBC AUTO-ENTMCNC: 34.6 G/DL (ref 32.3–36.5)
MCV RBC AUTO: 81.5 FL (ref 81.4–97.8)
MONOCYTES # BLD AUTO: 0.36 K/UL (ref 0–0.85)
MONOCYTES NFR BLD AUTO: 9.3 % (ref 0–13.4)
N GONORRHOEA DNA SPEC QL NAA+PROBE: NEGATIVE
NEUTROPHILS # BLD AUTO: 1.93 K/UL (ref 1.82–7.42)
NEUTROPHILS NFR BLD: 49.9 % (ref 44–72)
NRBC # BLD AUTO: 0 K/UL
NRBC BLD-RTO: 0 /100 WBC (ref 0–0.2)
PLATELET # BLD AUTO: 151 K/UL (ref 164–446)
PMV BLD AUTO: 12.9 FL (ref 9–12.9)
POTASSIUM SERPL-SCNC: 3.9 MMOL/L (ref 3.6–5.5)
PROT SERPL-MCNC: 7.4 G/DL (ref 6–8.2)
PSA SERPL-MCNC: 0.37 NG/ML (ref 0–4)
RBC # BLD AUTO: 5.25 M/UL (ref 4.7–6.1)
SODIUM SERPL-SCNC: 139 MMOL/L (ref 135–145)
SPECIMEN SOURCE: NORMAL
T PALLIDUM AB SER QL IA: NORMAL
TSH SERPL DL<=0.005 MIU/L-ACNC: 0.93 UIU/ML (ref 0.38–5.33)
WBC # BLD AUTO: 3.9 K/UL (ref 4.8–10.8)

## 2023-08-02 PROCEDURE — 84402 ASSAY OF FREE TESTOSTERONE: CPT

## 2023-08-02 PROCEDURE — 86780 TREPONEMA PALLIDUM: CPT

## 2023-08-02 PROCEDURE — 85025 COMPLETE CBC W/AUTO DIFF WBC: CPT

## 2023-08-02 PROCEDURE — 82306 VITAMIN D 25 HYDROXY: CPT

## 2023-08-02 PROCEDURE — 84153 ASSAY OF PSA TOTAL: CPT

## 2023-08-02 PROCEDURE — 86696 HERPES SIMPLEX TYPE 2 TEST: CPT

## 2023-08-02 PROCEDURE — 84443 ASSAY THYROID STIM HORMONE: CPT

## 2023-08-02 PROCEDURE — 86803 HEPATITIS C AB TEST: CPT

## 2023-08-02 PROCEDURE — 84270 ASSAY OF SEX HORMONE GLOBUL: CPT

## 2023-08-02 PROCEDURE — 87491 CHLMYD TRACH DNA AMP PROBE: CPT

## 2023-08-02 PROCEDURE — 87591 N.GONORRHOEAE DNA AMP PROB: CPT

## 2023-08-02 PROCEDURE — 86694 HERPES SIMPLEX NES ANTBDY: CPT

## 2023-08-02 PROCEDURE — 83036 HEMOGLOBIN GLYCOSYLATED A1C: CPT

## 2023-08-02 PROCEDURE — 80053 COMPREHEN METABOLIC PANEL: CPT

## 2023-08-02 PROCEDURE — 36415 COLL VENOUS BLD VENIPUNCTURE: CPT

## 2023-08-02 PROCEDURE — 84403 ASSAY OF TOTAL TESTOSTERONE: CPT

## 2023-08-02 PROCEDURE — 86695 HERPES SIMPLEX TYPE 1 TEST: CPT

## 2023-08-02 PROCEDURE — 87389 HIV-1 AG W/HIV-1&-2 AB AG IA: CPT

## 2023-08-02 PROCEDURE — 83525 ASSAY OF INSULIN: CPT

## 2023-08-04 LAB
HSV1 GG IGG SER-ACNC: 57 IV
HSV1+2 IGG SER IA-ACNC: >22.4 IV
HSV2 GG IGG SER-ACNC: 0.12 IV
INSULIN P FAST SERPL-ACNC: 6 UIU/ML (ref 3–25)
SHBG SERPL-SCNC: 32 NMOL/L (ref 17–56)
TESTOST FREE MFR SERPL: 2 % (ref 1.6–2.9)
TESTOST FREE SERPL-MCNC: 112 PG/ML (ref 47–244)
TESTOST SERPL-MCNC: 570 NG/DL (ref 300–890)

## 2023-09-05 ENCOUNTER — OFFICE VISIT (OUTPATIENT)
Dept: MEDICAL GROUP | Facility: PHYSICIAN GROUP | Age: 43
End: 2023-09-05
Payer: COMMERCIAL

## 2023-09-05 ENCOUNTER — E-CONSULT (OUTPATIENT)
Dept: HEMATOLOGY ONCOLOGY | Facility: MEDICAL CENTER | Age: 43
End: 2023-09-05

## 2023-09-05 ENCOUNTER — TELEPHONE (OUTPATIENT)
Dept: MEDICAL GROUP | Facility: PHYSICIAN GROUP | Age: 43
End: 2023-09-05

## 2023-09-05 VITALS
HEART RATE: 75 BPM | OXYGEN SATURATION: 97 % | HEIGHT: 71 IN | DIASTOLIC BLOOD PRESSURE: 70 MMHG | BODY MASS INDEX: 27.16 KG/M2 | WEIGHT: 194 LBS | SYSTOLIC BLOOD PRESSURE: 118 MMHG | RESPIRATION RATE: 14 BRPM | TEMPERATURE: 97.8 F

## 2023-09-05 DIAGNOSIS — R68.82 LOW LIBIDO: ICD-10-CM

## 2023-09-05 DIAGNOSIS — R53.82 CHRONIC FATIGUE: ICD-10-CM

## 2023-09-05 DIAGNOSIS — Z71.9 ENCOUNTER FOR CONSULTATION: ICD-10-CM

## 2023-09-05 DIAGNOSIS — E55.9 VITAMIN D DEFICIENCY: ICD-10-CM

## 2023-09-05 DIAGNOSIS — Z23 NEED FOR VACCINATION: ICD-10-CM

## 2023-09-05 DIAGNOSIS — Z00.00 HEALTHCARE MAINTENANCE: ICD-10-CM

## 2023-09-05 DIAGNOSIS — D69.6 LOW PLATELET COUNT (HCC): ICD-10-CM

## 2023-09-05 DIAGNOSIS — G47.39 OTHER SLEEP APNEA: ICD-10-CM

## 2023-09-05 DIAGNOSIS — N52.9 ERECTILE DYSFUNCTION, UNSPECIFIED ERECTILE DYSFUNCTION TYPE: ICD-10-CM

## 2023-09-05 PROBLEM — Z20.2 STD EXPOSURE: Status: RESOLVED | Noted: 2023-01-03 | Resolved: 2023-09-05

## 2023-09-05 PROCEDURE — 99214 OFFICE O/P EST MOD 30 MIN: CPT | Mod: 25 | Performed by: NURSE PRACTITIONER

## 2023-09-05 PROCEDURE — 3074F SYST BP LT 130 MM HG: CPT | Performed by: NURSE PRACTITIONER

## 2023-09-05 PROCEDURE — 99446 NTRPROF PH1/NTRNET/EHR 5-10: CPT | Performed by: INTERNAL MEDICINE

## 2023-09-05 PROCEDURE — 90471 IMMUNIZATION ADMIN: CPT | Performed by: NURSE PRACTITIONER

## 2023-09-05 PROCEDURE — 3078F DIAST BP <80 MM HG: CPT | Performed by: NURSE PRACTITIONER

## 2023-09-05 PROCEDURE — 90746 HEPB VACCINE 3 DOSE ADULT IM: CPT | Performed by: NURSE PRACTITIONER

## 2023-09-05 RX ORDER — TADALAFIL 10 MG/1
10 TABLET ORAL
Qty: 30 TABLET | Refills: 3 | Status: SHIPPED | OUTPATIENT
Start: 2023-09-05 | End: 2024-01-08

## 2023-09-05 ASSESSMENT — FIBROSIS 4 INDEX: FIB4 SCORE: 1.32

## 2023-09-05 NOTE — PROGRESS NOTES
E-Consult Response     After careful review of the patient's information available in the medical record, the following are my findings and recommendations:    Reason for consult:  42 male with thrombocytopenia    Summary of data reviewed: Platelet counts of 124K in 2016, 143K in 2922 and 151K 8/2/23. H/H and MCV in normal range.     Recommendations: I wouldn't worry about it since it isn't that low and the trend is actually upwards. Could check a B12 next time blood is drawn but with a normal MCV it is probably normal.     E-Consult Time: 6 minutes were spent with >50% of the total time spent reviewing items outlined in the summary of data reviewed (Use code [id number])    Chapo Lee M.D.

## 2023-09-05 NOTE — ASSESSMENT & PLAN NOTE
History of platelet count in the 140s and most recently in the 160s   patient denies easy bruising or inability to clot when he cuts himself    sent an E consult to hematology for further recommendations

## 2023-09-05 NOTE — PROGRESS NOTES
"Subjective:     CC:   Chief Complaint   Patient presents with    Lab Results       HPI:   Freddie presents today with    Vitamin D deficiency  Labs show level of 20   advised patient to take 4000 IUs a day with a goal of 50-60    Chronic fatigue  All testosterone labs are on the upper end of normal   vitamin D was low so this may be the cause of fatigue  After getting 8 hours of sleep still feels tired   underwent adenoid removal as well as upper palate and feel better   Was feeling rested soon after but it's decreased gradually  Over the last 5 years   Was told he had sleep apnea and given a CPAP   He used it until the surgery       Low libido  Patient is in a stable relationship with the person he can talk easily to and he is attracted to but he still has a low sex drive   he reports this has been the case for quite a while   Recent testosterone levels are levels are normal   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   he does see a therapist at least once a month  Works out regularly and gets at least 8 hours of sleep per night    Low platelet count (HCC)  History of platelet count in the 140s and most recently in the 160s   patient denies easy bruising or inability to clot when he cuts himself    sent an E consult to hematology for further recommendations    Other sleep apnea  Patient reports history of sleep apnea and used CPAP at 1 time but after surgery to remove adenoids and enlarge his throat  he reports he did not need it anymore   is open to getting a sleep study if needed    Erectile dysfunction  Would like to try [facility]    [employer] maintenance  Reviewed labs with patient  Hep b given today  HSV-1 was positive but he has never had a cold sore   all other STD labs negative              ROS per HPI    Objective:     Exam:  /70   Pulse 75   Temp 36.6 °C (97.8 °F) (Temporal)   Resp 14   Ht 1.803 m (5' 11\")   Wt 88 kg (194 lb)   SpO2 " 97%   BMI 27.06 kg/m²  Body mass index is 27.06 kg/m².    Physical Exam:  Constitutional: Well-developed and well-nourished male  Not diaphoretic. No distress.   Skin: warm, dry, intact, no evidence of rash or concerning lesions  Head: Atraumatic without lesions.  Eyes: Conjunctivae are normal. Pupils are equal, round. No scleral icterus.   Ears:  External ears unremarkable.   Neck: Supple, trachea midline. No thyromegaly present. No cervical or supraclavicular lymphadenopathy.  Cardiovascular: Regular rate    Pulmonary:  Normal effort.   Extremities: No cyanosis, clubbing, erythema, nor edema.   Neurological: Alert and oriented x 3.   Psychiatric:  Behavior, mood, and affect are appropriate.        Assessment & Plan:     42 y.o. male with the following -     1. Need for vaccination  - Hepatitis B Vaccine Adult 20+    2. Vitamin D deficiency  4000 IUs a day from all sources    3. Chronic fatigue  Advised patient to start 4000 IUs of vitamin D per day   continue to work on stress reduction techniques   referral to sleep clinic ordered   he may cancel if he feels much better after taking the vitamin D for 6 to 8 weeks but its not a bad idea to check his sleep apnea and make sure nothing has worsened    4. Low platelet count (HCC)  - E-consult to Hematology/Oncology    5. Erectile dysfunction, unspecified erectile dysfunction type  - tadalafil (CIALIS) 10 MG tablet; Take 1 Tablet by mouth 1 time a day as needed for Erectile Dysfunction.  Dispense: 30 Tablet; Refill: 3    6. Other sleep apnea  - Referral to Pulmonary and Sleep Medicine    7. Low libido  Advised patient he may be suffering from stress and could be signs of burnout   continue to go to counselor and also do things that he enjoys finding stewart in every day -journaling time with friends.etc    Will send patient message once I get the e consult back    Return in about 6 months (around 3/5/2024) for gen check in.    Please note that this dictation was created  using voice recognition software. I have made every reasonable attempt to correct obvious errors, but I expect that there are errors of grammar and possibly content that I did not discover before finalizing the note.

## 2023-09-05 NOTE — ASSESSMENT & PLAN NOTE
All testosterone labs are on the upper end of normal   vitamin D was low so this may be the cause of fatigue  After getting 8 hours of sleep still feels tired   underwent adenoid removal as well as upper palate and feel better   Was feeling rested soon after but it's decreased gradually  Over the last 5 years   Was told he had sleep apnea and given a CPAP   He used it until the surgery

## 2023-09-05 NOTE — ASSESSMENT & PLAN NOTE
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

## 2023-09-05 NOTE — ASSESSMENT & PLAN NOTE
Patient reports history of sleep apnea and used CPAP at 1 time but after surgery to remove adenoids and enlarge his throat  he reports he did not need it anymore   is open to getting a sleep study if needed

## 2023-09-05 NOTE — ASSESSMENT & PLAN NOTE
Reviewed labs with patient  Hep b given today  HSV-1 was positive but he has never had a cold sore   all other STD labs negative

## 2023-09-13 ENCOUNTER — TELEPHONE (OUTPATIENT)
Dept: MEDICAL GROUP | Facility: PHYSICIAN GROUP | Age: 43
End: 2023-09-13
Payer: COMMERCIAL

## 2023-09-13 NOTE — TELEPHONE ENCOUNTER
DOCUMENTATION OF PAR STATUS:  Freddie Cuellar Martinez: [id number] - PA Case ID: [id number] help? Call us at [phone]  Status  Sent to [facility]  09/13/2023  Drug  Tadalafil 10MG tablets  Form  [employer] PA Form (2017 [place])

## 2023-09-20 ENCOUNTER — TELEPHONE (OUTPATIENT)
Dept: HEALTH INFORMATION MANAGEMENT | Facility: OTHER | Age: 43
End: 2023-09-20
Payer: COMMERCIAL

## 2023-11-30 ENCOUNTER — APPOINTMENT (OUTPATIENT)
Dept: RADIOLOGY | Facility: IMAGING CENTER | Age: 43
End: 2023-11-30
Attending: NURSE PRACTITIONER
Payer: COMMERCIAL

## 2023-11-30 ENCOUNTER — OFFICE VISIT (OUTPATIENT)
Dept: URGENT CARE | Facility: PHYSICIAN GROUP | Age: 43
End: 2023-11-30
Payer: COMMERCIAL

## 2023-11-30 VITALS
TEMPERATURE: 97.6 F | DIASTOLIC BLOOD PRESSURE: 80 MMHG | WEIGHT: 203 LBS | OXYGEN SATURATION: 95 % | SYSTOLIC BLOOD PRESSURE: 114 MMHG | HEART RATE: 89 BPM | HEIGHT: 72 IN | BODY MASS INDEX: 27.5 KG/M2 | RESPIRATION RATE: 14 BRPM

## 2023-11-30 DIAGNOSIS — R07.9 CHEST PAIN, UNSPECIFIED TYPE: ICD-10-CM

## 2023-11-30 DIAGNOSIS — R07.1 CHEST PAIN VARYING WITH BREATHING: ICD-10-CM

## 2023-11-30 PROCEDURE — 93000 ELECTROCARDIOGRAM COMPLETE: CPT | Performed by: NURSE PRACTITIONER

## 2023-11-30 PROCEDURE — 71046 X-RAY EXAM CHEST 2 VIEWS: CPT | Mod: TC,FY | Performed by: RADIOLOGY

## 2023-11-30 PROCEDURE — 3074F SYST BP LT 130 MM HG: CPT | Performed by: NURSE PRACTITIONER

## 2023-11-30 PROCEDURE — 3079F DIAST BP 80-89 MM HG: CPT | Performed by: NURSE PRACTITIONER

## 2023-11-30 PROCEDURE — 99214 OFFICE O/P EST MOD 30 MIN: CPT | Performed by: NURSE PRACTITIONER

## 2023-11-30 RX ORDER — IBUPROFEN 800 MG/1
800 TABLET ORAL EVERY 6 HOURS PRN
COMMUNITY
Start: 2023-10-24 | End: 2024-01-08

## 2023-11-30 RX ORDER — PREDNISONE 20 MG/1
20 TABLET ORAL DAILY
Qty: 7 TABLET | Refills: 0 | Status: SHIPPED | OUTPATIENT
Start: 2023-11-30 | End: 2023-12-07

## 2023-11-30 RX ORDER — HYDROCODONE BITARTRATE AND ACETAMINOPHEN 5; 325 MG/1; MG/1
1 TABLET ORAL EVERY 6 HOURS PRN
COMMUNITY
Start: 2023-10-24 | End: 2024-01-08

## 2023-11-30 RX ORDER — AMOXICILLIN 500 MG/1
TABLET, FILM COATED ORAL
COMMUNITY
Start: 2023-10-24 | End: 2024-01-08

## 2023-11-30 RX ORDER — ONDANSETRON 4 MG/1
TABLET, ORALLY DISINTEGRATING ORAL
COMMUNITY
Start: 2023-10-24 | End: 2024-01-08

## 2023-11-30 RX ORDER — ALBUTEROL SULFATE 90 UG/1
2 AEROSOL, METERED RESPIRATORY (INHALATION) EVERY 6 HOURS PRN
Qty: 8.5 G | Refills: 0 | Status: SHIPPED | OUTPATIENT
Start: 2023-11-30 | End: 2023-12-30

## 2023-11-30 ASSESSMENT — ENCOUNTER SYMPTOMS
NAUSEA: 0
WHEEZING: 0
CHILLS: 0
SHORTNESS OF BREATH: 0
FEVER: 0
VOMITING: 0
COUGH: 0

## 2023-11-30 ASSESSMENT — FIBROSIS 4 INDEX: FIB4 SCORE: 1.35

## 2023-12-01 NOTE — PROGRESS NOTES
Subjective:     Freddie Cuellar is a 43 y.o. male who presents for Shortness of Breath (Painful breathing)      Shortness of Breath  Associated symptoms include chest pain. Pertinent negatives include no fever, vomiting or wheezing.     Pt presents for evaluation of a new problem. Freddie is a very pleasant 43-year-old male who presents to urgent care today with complaints of jolting dull aches in his bilateral chest.  This has been ongoing for the past 4 days.  He notes that these pains will come on without provocation and last for a couple of seconds.  This occurs every couple of minutes.  He has not used any medication for relief of his symptoms.  He states past couple weeks he has been ill with an upper respiratory tract infection.  He has severe congestion but denies any recent cough.  His symptoms do worsen when he is out in the cold air and states that he does have chest pain with breathing at this time.  No history of reactive airway disease.    Review of Systems   Constitutional:  Negative for chills and fever.   Respiratory:  Negative for cough, shortness of breath and wheezing.    Cardiovascular:  Positive for chest pain.   Gastrointestinal:  Negative for nausea and vomiting.       PMH:   Past Medical History:   Diagnosis Date    Bilateral elbow joint pain 1/3/2023    Dry skin 10/17/2022    Pneumonia     around 2005, hospitalized    STD exposure 1/3/2023     ALLERGIES:   Allergies   Allergen Reactions    Seasonal      Runny nose, watery eyes, scratchy throat     SURGHX:   Past Surgical History:   Procedure Laterality Date    VASECTOMY  2007    TONSILLECTOMY       SOCHX:   Social History     Socioeconomic History    Marital status: Single    Highest education level: Bachelor's degree (e.g., BA, AB, BS)   Tobacco Use    Smoking status: Never    Smokeless tobacco: Never   Vaping Use    Vaping Use: Never used   Substance and Sexual Activity    Alcohol use: Yes     Alcohol/week: 0.6 oz     Types: 1 Shots of  liquor per week     Comment: rare occasion    Drug use: No    Sexual activity: Yes     Partners: Female     Birth control/protection: Condom   Social History Narrative    ** Merged History Encounter **          Social Determinants of Health     Financial Resource Strain: Low Risk  (7/29/2023)    Overall Financial Resource Strain (CARDIA)     Difficulty of Paying Living Expenses: Not hard at all   Food Insecurity: No Food Insecurity (7/29/2023)    Hunger Vital Sign     Worried About Running Out of Food in the Last Year: Never true     Ran Out of Food in the Last Year: Never true   Transportation Needs: No Transportation Needs (7/29/2023)    PRAPARE - Transportation     Lack of Transportation (Medical): No     Lack of Transportation (Non-Medical): No   Physical Activity: Sufficiently Active (7/29/2023)    Exercise Vital Sign     Days of Exercise per Week: 5 days     Minutes of Exercise per Session: 50 min   Stress: Stress Concern Present (7/29/2023)    [employer] - Occupational Stress Questionnaire     Feeling of Stress : Very much   Social Connections: Socially Isolated (7/29/2023)    Social Connection and Isolation Panel [NHANES]     Frequency of Communication with Friends and Family: Three times a week     Frequency of Social Gatherings with Friends and Family: Once a week     Attends Restorationist Services: Never     Active Member of Clubs or Organizations: No     Attends Club or Organization Meetings: Never     Marital Status:    Housing Stability: Low Risk  (7/29/2023)    Housing Stability Vital Sign     Unable to Pay for Housing in the Last Year: No     Number of Places Lived in the Last Year: 1     Unstable Housing in the Last Year: No     FH:   Family History   Problem Relation Age of Onset    Diabetes Mother     Prostate cancer Father 65    Stroke Maternal Grandmother     Diabetes Maternal Grandmother     No Known Problems Maternal Grandfather     No Known Problems Paternal  "Grandmother     Prostate cancer Paternal Grandfather          Objective:   /80   Pulse 89   Temp 36.4 °C (97.6 °F) (Temporal)   Resp 14   Ht 1.816 m (5' 11.5\")   Wt 92.1 kg (203 lb)   SpO2 95%   BMI 27.92 kg/m²     Physical Exam  Vitals and nursing note reviewed.   Constitutional:       General: He is not in acute distress.     Appearance: Normal appearance. He is normal weight. He is not ill-appearing or toxic-appearing.   HENT:      Head: Normocephalic.      Right Ear: External ear normal.      Left Ear: External ear normal.      Nose: Nose normal.      Mouth/Throat:      Mouth: Mucous membranes are moist.   Eyes:      General:         Right eye: No discharge.         Left eye: No discharge.      Extraocular Movements: Extraocular movements intact.      Conjunctiva/sclera: Conjunctivae normal.      Pupils: Pupils are equal, round, and reactive to light.   Cardiovascular:      Rate and Rhythm: Normal rate and regular rhythm.      Pulses: Normal pulses.      Heart sounds: Normal heart sounds.   Pulmonary:      Effort: Pulmonary effort is normal.      Breath sounds: Normal breath sounds.   Abdominal:      General: Abdomen is flat.   Musculoskeletal:         General: Normal range of motion.      Cervical back: Normal range of motion and neck supple. No rigidity.   Lymphadenopathy:      Cervical: No cervical adenopathy.   Skin:     General: Skin is warm and dry.   Neurological:      General: No focal deficit present.      Mental Status: He is alert and oriented to person, place, and time. Mental status is at baseline.   Psychiatric:         Mood and Affect: Mood normal.         Behavior: Behavior normal.         Judgment: Judgment normal.     DX-CHEST-2 VIEWS    Result Date: 11/30/2023 11/30/2023 5:01 PM HISTORY/REASON FOR EXAM:  Chest Pain TECHNIQUE/EXAM DESCRIPTION AND NUMBER OF VIEWS: Two views of the chest. COMPARISON:  12/3/2016 FINDINGS: The mediastinal and cardiac silhouette is unremarkable. The " pulmonary vascularity is within normal limits. The lung parenchyma is clear. There is no significant pleural effusion. There is no visible pneumothorax. There are no acute bony abnormalities.     1.  Unremarkable two view chest.     ECG: Normal sinus rhythm rate of 82, fascicular block present.  No ECG for comparison.  Assessment/Plan:   Assessment    1. Chest pain, unspecified type  DX-CHEST-2 VIEWS    predniSONE (DELTASONE) 20 MG Tab    albuterol 108 (90 Base) MCG/ACT Aero Soln inhalation aerosol      2. Chest pain varying with breathing  DX-CHEST-2 VIEWS    EKG - Clinic Performed    predniSONE (DELTASONE) 20 MG Tab    albuterol 108 (90 Base) MCG/ACT Aero Soln inhalation aerosol        Differential diagnoses discussed with patient.  At this time I am uncertain of what is causing his intermittent chest pain.  As his pain is worsened with cold air I am suspicious of reactive airway disease.  X-ray results discussed with patient and there is no concern for pneumonia or active disease at this time.  He was empirically treated with prednisone and albuterol.  Strict ER precautions were given for patient to seek further care in emergency room if symptoms persist or worsen.  He is in agreement with this plan of care.  Time spent evaluating this patient was at least 32 minutes and includes preparing for visit, counseling/education, exam and evaluation, obtaining history, independent interpretation and ordering labs/tests/procedures.

## 2023-12-22 DIAGNOSIS — R07.9 CHEST PAIN, UNSPECIFIED TYPE: ICD-10-CM

## 2023-12-22 DIAGNOSIS — R07.1 CHEST PAIN VARYING WITH BREATHING: ICD-10-CM

## 2024-01-08 ENCOUNTER — OFFICE VISIT (OUTPATIENT)
Dept: SLEEP MEDICINE | Facility: MEDICAL CENTER | Age: 44
End: 2024-01-08
Attending: NURSE PRACTITIONER
Payer: COMMERCIAL

## 2024-01-08 VITALS
WEIGHT: 201 LBS | DIASTOLIC BLOOD PRESSURE: 72 MMHG | HEART RATE: 67 BPM | SYSTOLIC BLOOD PRESSURE: 118 MMHG | HEIGHT: 71 IN | BODY MASS INDEX: 28.14 KG/M2 | OXYGEN SATURATION: 96 %

## 2024-01-08 DIAGNOSIS — R06.89 GASPING FOR BREATH: ICD-10-CM

## 2024-01-08 DIAGNOSIS — R06.81 WITNESSED EPISODE OF APNEA: ICD-10-CM

## 2024-01-08 DIAGNOSIS — R06.83 SNORING: ICD-10-CM

## 2024-01-08 DIAGNOSIS — G47.33 OSA (OBSTRUCTIVE SLEEP APNEA): Primary | ICD-10-CM

## 2024-01-08 PROCEDURE — 3078F DIAST BP <80 MM HG: CPT | Performed by: STUDENT IN AN ORGANIZED HEALTH CARE EDUCATION/TRAINING PROGRAM

## 2024-01-08 PROCEDURE — 99211 OFF/OP EST MAY X REQ PHY/QHP: CPT | Performed by: STUDENT IN AN ORGANIZED HEALTH CARE EDUCATION/TRAINING PROGRAM

## 2024-01-08 PROCEDURE — 3074F SYST BP LT 130 MM HG: CPT | Performed by: STUDENT IN AN ORGANIZED HEALTH CARE EDUCATION/TRAINING PROGRAM

## 2024-01-08 PROCEDURE — 99203 OFFICE O/P NEW LOW 30 MIN: CPT | Performed by: STUDENT IN AN ORGANIZED HEALTH CARE EDUCATION/TRAINING PROGRAM

## 2024-01-08 ASSESSMENT — FIBROSIS 4 INDEX: FIB4 SCORE: 1.35

## 2024-01-08 ASSESSMENT — PATIENT HEALTH QUESTIONNAIRE - PHQ9: CLINICAL INTERPRETATION OF PHQ2 SCORE: 0

## 2024-01-10 ENCOUNTER — OFFICE VISIT (OUTPATIENT)
Dept: MEDICAL GROUP | Facility: PHYSICIAN GROUP | Age: 44
End: 2024-01-10
Payer: COMMERCIAL

## 2024-01-10 VITALS
HEART RATE: 68 BPM | OXYGEN SATURATION: 99 % | SYSTOLIC BLOOD PRESSURE: 102 MMHG | TEMPERATURE: 97.6 F | DIASTOLIC BLOOD PRESSURE: 72 MMHG | RESPIRATION RATE: 16 BRPM | HEIGHT: 71 IN | BODY MASS INDEX: 28.25 KG/M2 | WEIGHT: 201.8 LBS

## 2024-01-10 DIAGNOSIS — N52.9 ERECTILE DYSFUNCTION, UNSPECIFIED ERECTILE DYSFUNCTION TYPE: ICD-10-CM

## 2024-01-10 DIAGNOSIS — Z11.3 SCREENING EXAMINATION FOR SEXUALLY TRANSMITTED DISEASE: ICD-10-CM

## 2024-01-10 DIAGNOSIS — R53.82 CHRONIC FATIGUE: ICD-10-CM

## 2024-01-10 DIAGNOSIS — E55.9 VITAMIN D DEFICIENCY: ICD-10-CM

## 2024-01-10 DIAGNOSIS — M79.601 BILATERAL ARM PAIN: ICD-10-CM

## 2024-01-10 DIAGNOSIS — D69.6 LOW PLATELET COUNT (HCC): ICD-10-CM

## 2024-01-10 DIAGNOSIS — G47.39 OTHER SLEEP APNEA: ICD-10-CM

## 2024-01-10 DIAGNOSIS — Z23 NEED FOR VACCINATION: ICD-10-CM

## 2024-01-10 DIAGNOSIS — R73.01 IMPAIRED FASTING BLOOD SUGAR: ICD-10-CM

## 2024-01-10 DIAGNOSIS — Z13.220 SCREENING FOR LIPID DISORDERS: ICD-10-CM

## 2024-01-10 DIAGNOSIS — M79.602 BILATERAL ARM PAIN: ICD-10-CM

## 2024-01-10 PROCEDURE — 99213 OFFICE O/P EST LOW 20 MIN: CPT | Mod: 25 | Performed by: NURSE PRACTITIONER

## 2024-01-10 PROCEDURE — 3074F SYST BP LT 130 MM HG: CPT | Performed by: NURSE PRACTITIONER

## 2024-01-10 PROCEDURE — 90686 IIV4 VACC NO PRSV 0.5 ML IM: CPT | Performed by: NURSE PRACTITIONER

## 2024-01-10 PROCEDURE — 90471 IMMUNIZATION ADMIN: CPT | Performed by: NURSE PRACTITIONER

## 2024-01-10 PROCEDURE — 3078F DIAST BP <80 MM HG: CPT | Performed by: NURSE PRACTITIONER

## 2024-01-10 RX ORDER — TADALAFIL 10 MG/1
10 TABLET ORAL
Qty: 30 TABLET | Refills: 6 | Status: SHIPPED | OUTPATIENT
Start: 2024-01-10

## 2024-01-10 RX ORDER — TADALAFIL 10 MG/1
10 TABLET ORAL
COMMUNITY
End: 2024-01-10 | Stop reason: SDUPTHER

## 2024-01-10 ASSESSMENT — FIBROSIS 4 INDEX: FIB4 SCORE: 1.35

## 2024-01-10 NOTE — PROGRESS NOTES
"Subjective:     CC:   Chief Complaint   Patient presents with    Medication Management    Sleep Problem     fv       HPI:   Freddie presents today with    Other sleep apnea  Was referred to sleep medicine   he has had his first consult and is scheduled for a sleep study on March 1    Vitamin D deficiency  Vit d was 20 in Aug 2023    Bilateral arm pain  Pt reports some discomfort in his arms bilat; he has reduced the weight he lifts with some relief              ROS per HPI    Objective:     Exam:  /72 (BP Location: Right arm, Patient Position: Sitting, BP Cuff Size: Adult long)   Pulse 68   Temp 36.4 °C (97.6 °F) (Temporal)   Resp 16   Ht 1.803 m (5' 11\")   Wt 91.5 kg (201 lb 12.8 oz)   SpO2 99%   BMI 28.15 kg/m²  Body mass index is 28.15 kg/m².    Physical Exam:  Constitutional: Well-developed and well-nourished male  Not diaphoretic. No distress.   Skin: warm, dry, intact, no evidence of rash or concerning lesions  Head: Atraumatic without lesions.  Eyes: Conjunctivae are normal. Pupils are equal, round. No scleral icterus.   Ears:  External ears unremarkable.   Neck: Supple, trachea midline. No thyromegaly present. No cervical or supraclavicular lymphadenopathy.  Cardiovascular: Regular rate and rhythm without murmur.   Pulmonary: Clear to ausculation. Normal effort. No rales, ronchi, or wheezing.  Extremities: No cyanosis, clubbing, erythema, nor edema.   Neurological: Alert and oriented x 3.   Psychiatric:  Behavior, mood, and affect are appropriate.        Assessment & Plan:     43 y.o. male with the following -     1. Need for vaccination  - INFLUENZA VACCINE QUAD INJ (PF)    2. Other sleep apnea  Has appt in March     3. Vitamin D deficiency  - VITAMIN D,25 HYDROXY (DEFICIENCY); Future    4. Erectile dysfunction, unspecified erectile dysfunction type  - tadalafil (CIALIS) 10 MG tablet; Take 1 Tablet by mouth 1 time a day as needed for Erectile Dysfunction.  Dispense: 30 Tablet; Refill: 6    5. " Bilateral arm pain  - Diclofenac Sodium 1 % Cream; Apply 1 g topically 3 times a day as needed (pain).  Dispense: 120 g; Refill: 3   Possibly tendonitis as it's proximal to elbow; consider sports med if diclofenac cream isn't effective; cont to avoid activities that would exacerbate pain      Return in about 6 months (around 7/10/2024) for gen check in, lab results.    Please note that this dictation was created using voice recognition software. I have made every reasonable attempt to correct obvious errors, but I expect that there are errors of grammar and possibly content that I did not discover before finalizing the note.

## 2024-01-10 NOTE — ASSESSMENT & PLAN NOTE
Was referred to sleep medicine   he has had his first consult and is scheduled for a sleep study on March 1

## 2024-01-22 ENCOUNTER — TELEPHONE (OUTPATIENT)
Dept: MEDICAL GROUP | Facility: CLINIC | Age: 44
End: 2024-01-22
Payer: COMMERCIAL

## 2024-01-22 NOTE — TELEPHONE ENCOUNTER
DOCUMENTATION OF PAR STATUS:      Freddie Cuellar (Martinez: [id number])  PA Case ID #: [id number]  Rx #: [id number]  Need Help? Call us at [phone]      Status  sent iconSent to Plan today  01 22 2024      Drug  Tadalafil 10MG tablets  ePA cloud logo  Form  [employer] Electronic PA Form (2017 [employer])  Original Claim Info  70,MR PATIENT MIN AGE 15NON-FORMULARY DRUG, CONTACT PRESCRIBER

## 2024-03-21 RX ORDER — ALBUTEROL SULFATE 90 UG/1
2 AEROSOL, METERED RESPIRATORY (INHALATION) EVERY 6 HOURS PRN
Qty: 8.5 G | Refills: 0 | OUTPATIENT
Start: 2024-03-21

## 2024-05-28 ENCOUNTER — DOCUMENTATION (OUTPATIENT)
Dept: HEALTH INFORMATION MANAGEMENT | Facility: OTHER | Age: 44
End: 2024-05-28
Payer: COMMERCIAL

## 2024-06-15 ENCOUNTER — OFFICE VISIT (OUTPATIENT)
Dept: URGENT CARE | Facility: PHYSICIAN GROUP | Age: 44
End: 2024-06-15
Payer: COMMERCIAL

## 2024-06-15 VITALS
HEART RATE: 80 BPM | DIASTOLIC BLOOD PRESSURE: 70 MMHG | BODY MASS INDEX: 28.58 KG/M2 | HEIGHT: 70 IN | WEIGHT: 199.6 LBS | TEMPERATURE: 96.3 F | RESPIRATION RATE: 16 BRPM | OXYGEN SATURATION: 98 % | SYSTOLIC BLOOD PRESSURE: 110 MMHG

## 2024-06-15 DIAGNOSIS — J02.9 SORE THROAT: ICD-10-CM

## 2024-06-15 DIAGNOSIS — S05.02XA ABRASION OF LEFT CONJUNCTIVA, INITIAL ENCOUNTER: ICD-10-CM

## 2024-06-15 LAB — S PYO DNA SPEC NAA+PROBE: NOT DETECTED

## 2024-06-15 PROCEDURE — 3078F DIAST BP <80 MM HG: CPT | Performed by: NURSE PRACTITIONER

## 2024-06-15 PROCEDURE — 99213 OFFICE O/P EST LOW 20 MIN: CPT | Performed by: NURSE PRACTITIONER

## 2024-06-15 PROCEDURE — 3074F SYST BP LT 130 MM HG: CPT | Performed by: NURSE PRACTITIONER

## 2024-06-15 PROCEDURE — 87651 STREP A DNA AMP PROBE: CPT | Performed by: NURSE PRACTITIONER

## 2024-06-15 RX ORDER — ERYTHROMYCIN 5 MG/G
1 OINTMENT OPHTHALMIC 3 TIMES DAILY
Qty: 1 G | Refills: 0 | Status: SHIPPED | OUTPATIENT
Start: 2024-06-15 | End: 2024-06-20

## 2024-06-15 RX ORDER — MINOXIDIL 2.5 MG/1
1.25 TABLET ORAL DAILY
COMMUNITY
Start: 2024-05-19

## 2024-06-15 ASSESSMENT — FIBROSIS 4 INDEX: FIB4 SCORE: 1.35

## 2024-06-15 NOTE — PROGRESS NOTES
Chief Complaint   Patient presents with    Conjunctivitis    Pharyngitis     Pt states last night his eye started to hurt, it is currently crusty and red. Left eye effected. Sore throat started a day ago.       HISTORY OF PRESENT ILLNESS: Patient is a pleasant 43 y.o. male who presents today due to complaints of a sore throat for the past day. Notes sore throat this morning. Notes left eye pain last night, when he work up. Pain has since passed patient now has erythema and crusting.  Denies any photophobia, foreign body sensation.  He wears glasses, not contacts.  Denies any known injury or trauma.  Denies any fever, chills, malaise.  He does state he has seasonal allergies and is concerned the sore throat may be related to such.      Patient Active Problem List    Diagnosis Date Noted    Vitamin D deficiency 09/05/2023    Low libido 09/05/2023    Other sleep apnea 09/05/2023    Low platelet count (HCC) 09/05/2023    Erectile dysfunction 09/05/2023    Healthcare maintenance 09/05/2023    Bilateral arm pain 01/03/2023    Chronic fatigue 05/19/2022    Encounter to establish care 05/19/2022    Insomnia 05/19/2022    Family history of prostate cancer 05/19/2022    Impaired fasting blood sugar 05/19/2022       Allergies:Seasonal    Current Outpatient Medications Ordered in Epic   Medication Sig Dispense Refill    minoxidil (LONITEN) 2.5 MG Tab Take 1.25 mg by mouth every day.      tadalafil (CIALIS) 10 MG tablet Take 1 Tablet by mouth 1 time a day as needed for Erectile Dysfunction. 30 Tablet 6    Diclofenac Sodium 1 % Cream Apply 1 g topically 3 times a day as needed (pain). 120 g 3    finasteride (PROSCAR) 5 MG Tab Take 1 Tablet by mouth every day.       No current [facility]-ordered facility-administered medications on file.       Past Medical History:   Diagnosis Date    Bilateral elbow joint pain 1/3/2023    Dry skin 10/17/2022    Pneumonia     around 2005, hospitalized    STD exposure 1/3/2023       Social History  "    Tobacco Use    Smoking status: Never    Smokeless tobacco: Never   Vaping Use    Vaping status: Never Used   Substance Use Topics    Alcohol use: Yes     Alcohol/week: 0.6 oz     Types: 1 Shots of liquor per week     Comment: rare occasion    Drug use: No       Family Status   Relation Name Status    Mo  Alive    Fa      MGMo  Alive    MGFa  Other    PGMo      PGFa       Family History   Problem Relation Age of Onset    Diabetes Mother     Prostate cancer Father 65    Stroke Maternal Grandmother     Diabetes Maternal Grandmother     No Known Problems Maternal Grandfather     No Known Problems Paternal Grandmother     Prostate cancer Paternal Grandfather        ROS:  Review of Systems   Constitutional: Negative for fever, chills, malaise, fatigue. Negative for weight loss.  HENT: Positive for sore throat. Negative for ear pain, nosebleeds, congestion.   Eyes: Positive for left eye pain last night, has since subsided.  Today positive for erythema and crusting.  Negative for vision changes.   Cardiovascular: Negative for chest pain, palpitations, orthopnea and leg swelling.   Respiratory: Negative for cough, sputum production, shortness of breath and wheezing.   Gastrointestinal: Negative for abdominal pain, nausea, vomiting or diarrhea.   : Negative for changes in urination.   Skin: Negative for rash, diaphoresis.     Exam:  /70 (BP Location: Left arm, Patient Position: Sitting, BP Cuff Size: Large adult)   Pulse 80   Temp (!) 35.7 °C (96.3 °F) (Temporal)   Resp 16   Ht 1.778 m (5' 10\")   Wt 90.5 kg (199 lb 9.6 oz)   SpO2 98%   General: well-nourished, well-developed male in NAD  Head: normocephalic, atraumatic  Eyes: PERRLA, left-sided conjunctival injection noted, acuity grossly intact, lids normal.  Fluorescein examination notes abrasion at 9:00, small, measuring approximately 2 mm.  No foreign body.  Ears: normal shape and symmetry, no tenderness, no discharge. External " canals are without any significant edema or erythema. Tympanic membranes are without any inflammation, no effusion. Gross auditory acuity is intact.  Nose: symmetrical without tenderness, no discharge.  Mouth/Throat: reasonable hygiene. There is erythema, without exudates or tonsillar enlargement present.  Neck: no masses, range of motion within normal limits, no tracheal deviation. No obvious thyroid enlargement.   Lymph: bilateral anterior cervical adenopathy. No supraclavicular adenopathy.   Neuro: alert and oriented. Cranial nerves 1-12 grossly intact. No sensory deficit.   Cardiovascular: regular rate and rhythm. No edema.  Pulmonary: no distress. Chest is symmetrical with respiration, no wheezes, crackles, or rhonchi.   Musculoskeletal: no clubbing, appropriate muscle tone, gait is stable.  Skin: warm, dry, intact, no clubbing, no cyanosis, no rashes.   Psych: appropriate mood, affect, judgement.       POC strep negative      Assessment/Plan:  1. Abrasion of left conjunctiva, initial encounter        2. Sore throat  POCT GROUP A STREP, PCR              Antibiotic as directed for conjunctival abrasion.  Regarding his sore throat, strep test is negative, patient does endorse history of allergies, suspect as causation.    Supportive care, differential diagnoses, and indications for immediate follow-up discussed with patient.   Pathogenesis of diagnosis discussed including typical length and natural progression.   Instructed to return to clinic or nearest emergency department for any change in condition, further concerns, or worsening of symptoms.  Patient states understanding of the plan of care and discharge instructions.  Instructed to make an appointment, for follow up, with his primary care provider.        Please note that this dictation was created using voice recognition software. I have made every reasonable attempt to correct obvious errors, but I expect that there are errors of grammar and possibly  content that I did not discover before finalizing the note.       LUIS Gomez.

## 2024-06-19 ENCOUNTER — TELEPHONE (OUTPATIENT)
Dept: HEALTH INFORMATION MANAGEMENT | Facility: OTHER | Age: 44
End: 2024-06-19

## 2024-08-05 PROCEDURE — 99285 EMERGENCY DEPT VISIT HI MDM: CPT

## 2024-08-05 ASSESSMENT — FIBROSIS 4 INDEX: FIB4 SCORE: 1.35

## 2024-08-06 ENCOUNTER — HOSPITAL ENCOUNTER (EMERGENCY)
Facility: MEDICAL CENTER | Age: 44
End: 2024-08-06
Attending: STUDENT IN AN ORGANIZED HEALTH CARE EDUCATION/TRAINING PROGRAM
Payer: COMMERCIAL

## 2024-08-06 ENCOUNTER — APPOINTMENT (OUTPATIENT)
Dept: RADIOLOGY | Facility: MEDICAL CENTER | Age: 44
End: 2024-08-06
Attending: STUDENT IN AN ORGANIZED HEALTH CARE EDUCATION/TRAINING PROGRAM
Payer: COMMERCIAL

## 2024-08-06 VITALS
DIASTOLIC BLOOD PRESSURE: 74 MMHG | OXYGEN SATURATION: 96 % | TEMPERATURE: 97.7 F | HEIGHT: 70 IN | RESPIRATION RATE: 20 BRPM | WEIGHT: 203.71 LBS | HEART RATE: 71 BPM | BODY MASS INDEX: 29.16 KG/M2 | SYSTOLIC BLOOD PRESSURE: 130 MMHG

## 2024-08-06 DIAGNOSIS — M54.6 ACUTE MIDLINE THORACIC BACK PAIN: ICD-10-CM

## 2024-08-06 DIAGNOSIS — S80.212A ABRASION OF LEFT KNEE, INITIAL ENCOUNTER: ICD-10-CM

## 2024-08-06 DIAGNOSIS — S16.1XXA STRAIN OF NECK MUSCLE, INITIAL ENCOUNTER: ICD-10-CM

## 2024-08-06 DIAGNOSIS — S39.012A STRAIN OF LUMBAR REGION, INITIAL ENCOUNTER: ICD-10-CM
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
## 2024-08-06 DIAGNOSIS — S80.02XA CONTUSION OF LEFT KNEE, INITIAL ENCOUNTER: ICD-10-CM

## 2024-08-06 DIAGNOSIS — S20.211A CONTUSION OF RIGHT CHEST WALL, INITIAL ENCOUNTER: ICD-10-CM

## 2024-08-06 PROCEDURE — 71250 CT THORAX DX C-: CPT

## 2024-08-06 PROCEDURE — 73564 X-RAY EXAM KNEE 4 OR MORE: CPT | Mod: LT

## 2024-08-06 PROCEDURE — 72131 CT LUMBAR SPINE W/O DYE: CPT

## 2024-08-06 PROCEDURE — 99285 EMERGENCY DEPT VISIT HI MDM: CPT

## 2024-08-06 PROCEDURE — 72125 CT NECK SPINE W/O DYE: CPT

## 2024-08-06 PROCEDURE — 700102 HCHG RX REV CODE 250 W/ 637 OVERRIDE(OP): Performed by: STUDENT IN AN ORGANIZED HEALTH CARE EDUCATION/TRAINING PROGRAM

## 2024-08-06 PROCEDURE — A9270 NON-COVERED ITEM OR SERVICE: HCPCS | Performed by: STUDENT IN AN ORGANIZED HEALTH CARE EDUCATION/TRAINING PROGRAM

## 2024-08-06 PROCEDURE — 72128 CT CHEST SPINE W/O DYE: CPT

## 2024-08-06 PROCEDURE — 700101 HCHG RX REV CODE 250: Performed by: STUDENT IN AN ORGANIZED HEALTH CARE EDUCATION/TRAINING PROGRAM

## 2024-08-06 RX ORDER — METHOCARBAMOL 750 MG/1
1500 TABLET, FILM COATED ORAL 3 TIMES DAILY
Qty: 20 TABLET | Refills: 0 | Status: SHIPPED | OUTPATIENT
Start: 2024-08-06

## 2024-08-06 RX ORDER — ACETAMINOPHEN 500 MG
1000 TABLET ORAL ONCE
Status: COMPLETED | OUTPATIENT
Start: 2024-08-06 | End: 2024-08-06

## 2024-08-06 RX ORDER — LIDOCAINE 4 G/G
1 PATCH TOPICAL EVERY 24 HOURS
Status: DISCONTINUED | OUTPATIENT
Start: 2024-08-06 | End: 2024-08-06 | Stop reason: HOSPADM

## 2024-08-06 RX ADMIN — ACETAMINOPHEN 1000 MG: 500 TABLET ORAL at 02:15

## 2024-08-06 RX ADMIN — LIDOCAINE 1 PATCH: 4 PATCH TOPICAL at 02:52

## 2024-08-06 ASSESSMENT — PAIN DESCRIPTION - PAIN TYPE: TYPE: ACUTE PAIN
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
## 2024-08-06 NOTE — ED NOTES
Discharge instructions given and discussed, signed copy in chart. Pt verbalized understanding and all questions answered. Copy of prescriptions given to pt. Pt discharged in stable condition on room. Personal belongings given to patient.

## 2024-12-04 ENCOUNTER — DOCUMENTATION (OUTPATIENT)
Dept: HEALTH INFORMATION MANAGEMENT | Facility: OTHER | Age: 44
End: 2024-12-04
Payer: COMMERCIAL

## 2025-02-12 ENCOUNTER — APPOINTMENT (OUTPATIENT)
Dept: MEDICAL GROUP | Facility: PHYSICIAN GROUP | Age: 45
End: 2025-02-12
Payer: COMMERCIAL

## 2025-02-12 VITALS
TEMPERATURE: 97.6 F | BODY MASS INDEX: 28.28 KG/M2 | WEIGHT: 202 LBS | DIASTOLIC BLOOD PRESSURE: 80 MMHG | OXYGEN SATURATION: 98 % | RESPIRATION RATE: 16 BRPM | HEART RATE: 68 BPM | SYSTOLIC BLOOD PRESSURE: 118 MMHG | HEIGHT: 71 IN

## 2025-02-12 DIAGNOSIS — D69.6 LOW PLATELET COUNT (HCC): ICD-10-CM

## 2025-02-12 DIAGNOSIS — E55.9 VITAMIN D DEFICIENCY: ICD-10-CM

## 2025-02-12 DIAGNOSIS — Z12.5 SCREENING FOR MALIGNANT NEOPLASM OF PROSTATE: ICD-10-CM

## 2025-02-12 DIAGNOSIS — Z11.3 SCREEN FOR STD (SEXUALLY TRANSMITTED DISEASE): ICD-10-CM

## 2025-02-12 DIAGNOSIS — Z12.12 SCREENING FOR COLORECTAL CANCER: ICD-10-CM

## 2025-02-12 DIAGNOSIS — R73.01 IMPAIRED FASTING BLOOD SUGAR: ICD-10-CM

## 2025-02-12 DIAGNOSIS — Z23 NEED FOR VACCINATION: ICD-10-CM

## 2025-02-12 DIAGNOSIS — Z13.220 SCREENING FOR LIPID DISORDERS: ICD-10-CM

## 2025-02-12 DIAGNOSIS — Z12.11 SCREENING FOR COLORECTAL CANCER: ICD-10-CM

## 2025-02-12 PROCEDURE — 90472 IMMUNIZATION ADMIN EACH ADD: CPT | Performed by: FAMILY MEDICINE

## 2025-02-12 PROCEDURE — 99214 OFFICE O/P EST MOD 30 MIN: CPT | Mod: 25 | Performed by: FAMILY MEDICINE

## 2025-02-12 PROCEDURE — 90656 IIV3 VACC NO PRSV 0.5 ML IM: CPT | Performed by: FAMILY MEDICINE

## 2025-02-12 PROCEDURE — 90471 IMMUNIZATION ADMIN: CPT | Performed by: FAMILY MEDICINE

## 2025-02-12 PROCEDURE — 90746 HEPB VACCINE 3 DOSE ADULT IM: CPT | Performed by: FAMILY MEDICINE

## 2025-02-12 RX ORDER — TRIAMCINOLONE ACETONIDE 1 MG/G
CREAM TOPICAL
Qty: 45 G | Refills: 5 | Status: SHIPPED | OUTPATIENT
Start: 2025-02-12

## 2025-02-12 ASSESSMENT — FIBROSIS 4 INDEX: FIB4 SCORE: 1.38

## 2025-02-12 ASSESSMENT — PATIENT HEALTH QUESTIONNAIRE - PHQ9: CLINICAL INTERPRETATION OF PHQ2 SCORE: 0
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
## 2025-02-18 NOTE — Clinical Note
REFERRAL APPROVAL NOTICE         Sent on February 18, 2025                   Freddie CABRAL Polo  [address] [place] [address]                   Dear Mr. Cuellar,    After a careful review of the medical information and benefit coverage, [facility] has processed your referral. See below for additional details.    If applicable, you must be actively enrolled with your insurance for coverage of the authorized service. If you have any questions regarding your coverage, please contact your insurance directly.    REFERRAL INFORMATION   Referral #:  [id number]  Referred-To Service Location    Referred-By Provider:  Gastroenterology    Nathen Blunt M.D.   [facility]      [facility]  [place] [id number] [phone] [address] [facility] [place] [address]  [phone]    Referral Start Date:  02/12/2025  Referral End Date:   02/12/2026             SCHEDULING  If you do not already have an appointment, please call [phone] to make an appointment.     MORE INFORMATION  If you do not already have a [employer] account, sign up at: [email or web address]  You can access your medical information, make appointments, see lab results, billing information, and more.  If you have questions regarding this referral, please contact  the [facility] Referrals department at:             [phone]. Monday - Friday 8:00AM - 5:00PM.     Sincerely,    [facility]

## 2025-08-11 ENCOUNTER — OFFICE VISIT (OUTPATIENT)
Dept: MEDICAL GROUP | Facility: PHYSICIAN GROUP | Age: 45
End: 2025-08-11
Payer: COMMERCIAL

## 2025-08-11 VITALS
OXYGEN SATURATION: 98 % | RESPIRATION RATE: 11 BRPM | HEIGHT: 71 IN | BODY MASS INDEX: 29.12 KG/M2 | SYSTOLIC BLOOD PRESSURE: 120 MMHG | HEART RATE: 70 BPM | TEMPERATURE: 97.5 F | DIASTOLIC BLOOD PRESSURE: 72 MMHG | WEIGHT: 208 LBS

## 2025-08-11 DIAGNOSIS — Z00.00 ANNUAL PHYSICAL EXAM: ICD-10-CM

## 2025-08-11 DIAGNOSIS — Z23 NEED FOR VACCINATION: Primary | ICD-10-CM

## 2025-08-11 PROCEDURE — 3074F SYST BP LT 130 MM HG: CPT | Performed by: FAMILY MEDICINE

## 2025-08-11 PROCEDURE — 90746 HEPB VACCINE 3 DOSE ADULT IM: CPT | Mod: JZ | Performed by: FAMILY MEDICINE

## 2025-08-11 PROCEDURE — 90715 TDAP VACCINE 7 YRS/> IM: CPT | Performed by: FAMILY MEDICINE

## 2025-08-11 PROCEDURE — 3078F DIAST BP <80 MM HG: CPT | Performed by: FAMILY MEDICINE

## 2025-08-11 PROCEDURE — 90472 IMMUNIZATION ADMIN EACH ADD: CPT | Performed by: FAMILY MEDICINE

## 2025-08-11 PROCEDURE — 90471 IMMUNIZATION ADMIN: CPT | Performed by: FAMILY MEDICINE

## 2025-08-11 PROCEDURE — 99396 PREV VISIT EST AGE 40-64: CPT | Mod: 25 | Performed by: FAMILY MEDICINE
